# Patient Record
Sex: MALE | Race: WHITE | NOT HISPANIC OR LATINO | Employment: FULL TIME
[De-identification: names, ages, dates, MRNs, and addresses within clinical notes are randomized per-mention and may not be internally consistent; named-entity substitution may affect disease eponyms.]

---

## 2017-01-09 ENCOUNTER — RECORDS - HEALTHEAST (OUTPATIENT)
Dept: ADMINISTRATIVE | Facility: OTHER | Age: 42
End: 2017-01-09

## 2017-05-22 ENCOUNTER — RECORDS - HEALTHEAST (OUTPATIENT)
Dept: ADMINISTRATIVE | Facility: OTHER | Age: 42
End: 2017-05-22

## 2017-07-14 ENCOUNTER — RECORDS - HEALTHEAST (OUTPATIENT)
Dept: ADMINISTRATIVE | Facility: OTHER | Age: 42
End: 2017-07-14

## 2017-08-10 ENCOUNTER — RECORDS - HEALTHEAST (OUTPATIENT)
Dept: ADMINISTRATIVE | Facility: OTHER | Age: 42
End: 2017-08-10

## 2017-08-16 ENCOUNTER — RECORDS - HEALTHEAST (OUTPATIENT)
Dept: ADMINISTRATIVE | Facility: OTHER | Age: 42
End: 2017-08-16

## 2017-08-17 ENCOUNTER — RECORDS - HEALTHEAST (OUTPATIENT)
Dept: ADMINISTRATIVE | Facility: OTHER | Age: 42
End: 2017-08-17

## 2021-03-18 ENCOUNTER — HOSPITAL ENCOUNTER (EMERGENCY)
Facility: CLINIC | Age: 46
Discharge: HOME | End: 2021-03-18
Attending: NURSE PRACTITIONER | Admitting: NURSE PRACTITIONER
Payer: COMMERCIAL

## 2021-03-18 VITALS
RESPIRATION RATE: 18 BRPM | SYSTOLIC BLOOD PRESSURE: 136 MMHG | TEMPERATURE: 97.6 F | DIASTOLIC BLOOD PRESSURE: 81 MMHG | BODY MASS INDEX: 33.6 KG/M2 | OXYGEN SATURATION: 98 % | WEIGHT: 240 LBS | HEART RATE: 71 BPM | HEIGHT: 71 IN

## 2021-03-18 DIAGNOSIS — S05.00XA CORNEAL ABRASION: ICD-10-CM

## 2021-03-18 PROCEDURE — 250N000009 HC RX 250: Performed by: NURSE PRACTITIONER

## 2021-03-18 PROCEDURE — 99283 EMERGENCY DEPT VISIT LOW MDM: CPT | Performed by: NURSE PRACTITIONER

## 2021-03-18 PROCEDURE — 99284 EMERGENCY DEPT VISIT MOD MDM: CPT | Performed by: NURSE PRACTITIONER

## 2021-03-18 RX ORDER — ERYTHROMYCIN 5 MG/G
0.5 OINTMENT OPHTHALMIC 3 TIMES DAILY
Qty: 1 G | Refills: 0 | Status: ON HOLD | OUTPATIENT
Start: 2021-03-18 | End: 2023-02-28

## 2021-03-18 RX ORDER — TETRACAINE HYDROCHLORIDE 5 MG/ML
1-2 SOLUTION OPHTHALMIC ONCE
Status: COMPLETED | OUTPATIENT
Start: 2021-03-18 | End: 2021-03-18

## 2021-03-18 RX ADMIN — TETRACAINE HYDROCHLORIDE 2 DROP: 5 SOLUTION OPHTHALMIC at 16:31

## 2021-03-18 ASSESSMENT — VISUAL ACUITY
OU: 1
OS: 20/70
OD: 20/25

## 2021-03-18 ASSESSMENT — ENCOUNTER SYMPTOMS
EYE DISCHARGE: 1
EYE REDNESS: 1
PHOTOPHOBIA: 0

## 2021-03-18 ASSESSMENT — MIFFLIN-ST. JEOR: SCORE: 2005.76

## 2021-03-18 NOTE — ED PROVIDER NOTES
History     Chief Complaint   Patient presents with     Eye Pain     left eye rendess, pain and clear drainage when woke up this am     HPI  Bi Zavala is a 45 year old male who presents to the emergency department for evaluation of left eye redness. Patient reports he awoke this am and had diffuse redness to the left eye and a scratchy sensation. Clear drainage/tears throughout the day. No vision changes or eye pain. No known injury or foreign body.     Allergies:  Allergies   Allergen Reactions     Sulfa Drugs Rash       Problem List:    Patient Active Problem List    Diagnosis Date Noted     CARDIOVASCULAR SCREENING; LDL GOAL LESS THAN 160 10/31/2010     Priority: Medium     REG ENTERITIS, LG INTEST 08/07/2008     Priority: Medium     probably not inflammatory bowel disease apparently per GI - get records - currently asx, and not on asocol or prednisone.       Anemia 06/25/2007     Priority: Medium     appears to have stabilized but needs to reverse to keep above 8 - relatively asx - start nifrex   Problem list name updated by automated process. Provider to review       Hypopotassemia 06/25/2007     Priority: Medium     probably from prednisone, though had started prior w diarrhea.  stable on 40 meq KCl       Other type of migraine 10/16/2005     Priority: Medium     Diagnosis updated by automated process. Provider to review and confirm.          Past Medical History:    Past Medical History:   Diagnosis Date     Migraine NOS/not intrcbl      Pervasv dev dis-cur NEC        Past Surgical History:    No past surgical history on file.    Family History:    Family History   Problem Relation Age of Onset     Musculoskeletal Disorder Mother         MS     Hypertension Mother      Neurologic Disorder Mother         migraine     Arthritis Mother      Allergies Mother      Hypertension Father      Eye Disorder Father      Arthritis Maternal Grandmother      Osteoporosis Maternal Grandmother      Arthritis Maternal  "Grandfather      Arthritis Paternal Grandmother      Eye Disorder Paternal Grandmother      Heart Disease Paternal Grandfather      Neurologic Disorder Sister         migraines       Social History:  Marital Status:  Single [1]  Social History     Tobacco Use     Smoking status: Never Smoker   Substance Use Topics     Alcohol use: No     Drug use: No        Medications:    erythromycin (ROMYCIN) 5 MG/GM ophthalmic ointment  Ferrous Sulfate 324 (65 FE) MG TBEC  OMEPRAZOLE PO          Review of Systems   Eyes: Positive for discharge and redness. Negative for photophobia and visual disturbance.   All other systems reviewed and are negative.      Physical Exam   BP: 136/81  Pulse: 71  Temp: 97.6  F (36.4  C)  Resp: 18  Height: 180.3 cm (5' 11\")  Weight: 108.9 kg (240 lb)  SpO2: 98 %      Physical Exam  Constitutional:       General: He is not in acute distress.     Appearance: Normal appearance.   Eyes:      General: Lids are normal. Vision grossly intact. Gaze aligned appropriately.         Left eye: No foreign body.      Extraocular Movements: Extraocular movements intact.      Conjunctiva/sclera:      Left eye: Left conjunctiva is injected.      Pupils: Pupils are equal, round, and reactive to light.      Left eye: Corneal abrasion and fluorescein uptake present.        Comments: Very small piece of black matter removed from the left upper eyelid once everted. Left corneal abrasion as indicated above seen with fluorescein.    Cardiovascular:      Rate and Rhythm: Normal rate.   Pulmonary:      Effort: Pulmonary effort is normal.   Musculoskeletal: Normal range of motion.   Skin:     General: Skin is warm.      Capillary Refill: Capillary refill takes less than 2 seconds.   Neurological:      General: No focal deficit present.      Mental Status: He is alert.         ED Course        Procedures    No results found for this or any previous visit (from the past 24 hour(s)).    Medications   tetracaine (PONTOCAINE) 0.5 % " ophthalmic solution 1-2 drop (2 drops Left Eye Given 3/18/21 6639)       Assessments & Plan (with Medical Decision Making)   Bi Zavala is a 45 year old male who presents to the emergency department for evaluation of left eye redness. Patient reports he awoke this am and had diffuse redness to the left eye and a scratchy sensation. Clear drainage/tears throughout the day. Vital signs normal. Physical exam as above. Will provide erythromycin ointment. Advised to follow up with optho in 2-3 days. Return to the department with new or worsening symptoms. Patient and mother are agreeable to plan of care and comfortable with discharge. Discharged in good condition.     I have reviewed the nursing notes.    I have reviewed the findings, diagnosis, plan and need for follow up with the patient.  Discharge Medication List as of 3/18/2021  4:41 PM      START taking these medications    Details   erythromycin (ROMYCIN) 5 MG/GM ophthalmic ointment Place 0.5 inches Into the left eye 3 times dailyDisp-1 g, [id number]           Final diagnoses:   Corneal abrasion     3/18/2021   [facility] EMERGENCY DEPT     Arabella Abbott, APRN CNP  03/18/21 1593

## 2021-07-24 ENCOUNTER — HEALTH MAINTENANCE LETTER (OUTPATIENT)
Age: 46
End: 2021-07-24

## 2021-09-18 ENCOUNTER — HEALTH MAINTENANCE LETTER (OUTPATIENT)
Age: 46
End: 2021-09-18

## 2022-08-20 ENCOUNTER — HEALTH MAINTENANCE LETTER (OUTPATIENT)
Age: 47
End: 2022-08-20

## 2022-11-19 ENCOUNTER — HEALTH MAINTENANCE LETTER (OUTPATIENT)
Age: 47
End: 2022-11-19

## 2023-02-28 ENCOUNTER — ANESTHESIA EVENT (OUTPATIENT)
Dept: SURGERY | Facility: HOSPITAL | Age: 48
End: 2023-02-28
Payer: COMMERCIAL

## 2023-02-28 ENCOUNTER — TRANSFERRED RECORDS (OUTPATIENT)
Dept: HEALTH INFORMATION MANAGEMENT | Facility: CLINIC | Age: 48
End: 2023-02-28
Payer: COMMERCIAL

## 2023-02-28 ENCOUNTER — ANESTHESIA (OUTPATIENT)
Dept: SURGERY | Facility: HOSPITAL | Age: 48
End: 2023-02-28
Payer: COMMERCIAL

## 2023-02-28 ENCOUNTER — HOSPITAL ENCOUNTER (OUTPATIENT)
Facility: HOSPITAL | Age: 48
Discharge: HOME | End: 2023-02-28
Attending: COLON & RECTAL SURGERY | Admitting: COLON & RECTAL SURGERY
Payer: COMMERCIAL

## 2023-02-28 VITALS
HEART RATE: 67 BPM | SYSTOLIC BLOOD PRESSURE: 112 MMHG | DIASTOLIC BLOOD PRESSURE: 72 MMHG | WEIGHT: 250.8 LBS | OXYGEN SATURATION: 97 % | RESPIRATION RATE: 9 BRPM | BODY MASS INDEX: 34.98 KG/M2 | TEMPERATURE: 98.6 F

## 2023-02-28 DIAGNOSIS — K61.0 PERIANAL ABSCESS: Primary | ICD-10-CM

## 2023-02-28 DIAGNOSIS — K60.30 ANAL FISTULA: Primary | ICD-10-CM

## 2023-02-28 PROCEDURE — 250N000009 HC RX 250: Performed by: ANESTHESIOLOGY

## 2023-02-28 PROCEDURE — 258N000003 HC RX IP 258 OP 636: Performed by: NURSE ANESTHETIST, CERTIFIED REGISTERED

## 2023-02-28 PROCEDURE — 258N000003 HC RX IP 258 OP 636: Performed by: ANESTHESIOLOGY

## 2023-02-28 PROCEDURE — 370N000017 HC ANESTHESIA TECHNICAL FEE, PER MIN: Performed by: COLON & RECTAL SURGERY

## 2023-02-28 PROCEDURE — 250N000011 HC RX IP 250 OP 636: Performed by: COLON & RECTAL SURGERY

## 2023-02-28 PROCEDURE — 250N000011 HC RX IP 250 OP 636: Performed by: ANESTHESIOLOGY

## 2023-02-28 PROCEDURE — 250N000025 HC SEVOFLURANE, PER MIN: Performed by: COLON & RECTAL SURGERY

## 2023-02-28 PROCEDURE — 999N000141 HC STATISTIC PRE-PROCEDURE NURSING ASSESSMENT: Performed by: COLON & RECTAL SURGERY

## 2023-02-28 PROCEDURE — 250N000011 HC RX IP 250 OP 636: Performed by: NURSE ANESTHETIST, CERTIFIED REGISTERED

## 2023-02-28 PROCEDURE — 250N000009 HC RX 250: Performed by: NURSE ANESTHETIST, CERTIFIED REGISTERED

## 2023-02-28 PROCEDURE — 272N000001 HC OR GENERAL SUPPLY STERILE: Performed by: COLON & RECTAL SURGERY

## 2023-02-28 PROCEDURE — 710N000009 HC RECOVERY PHASE 1, LEVEL 1, PER MIN: Performed by: COLON & RECTAL SURGERY

## 2023-02-28 PROCEDURE — 360N000075 HC SURGERY LEVEL 2, PER MIN: Performed by: COLON & RECTAL SURGERY

## 2023-02-28 PROCEDURE — 250N000009 HC RX 250: Performed by: COLON & RECTAL SURGERY

## 2023-02-28 PROCEDURE — 710N000012 HC RECOVERY PHASE 2, PER MINUTE: Performed by: COLON & RECTAL SURGERY

## 2023-02-28 RX ORDER — PROPOFOL 10 MG/ML
INJECTION, EMULSION INTRAVENOUS PRN
Status: DISCONTINUED | OUTPATIENT
Start: 2023-02-28 | End: 2023-02-28

## 2023-02-28 RX ORDER — KETAMINE HYDROCHLORIDE 10 MG/ML
INJECTION INTRAMUSCULAR; INTRAVENOUS PRN
Status: DISCONTINUED | OUTPATIENT
Start: 2023-02-28 | End: 2023-02-28

## 2023-02-28 RX ORDER — BUPIVACAINE HYDROCHLORIDE 2.5 MG/ML
INJECTION, SOLUTION INFILTRATION; PERINEURAL PRN
Status: DISCONTINUED | OUTPATIENT
Start: 2023-02-28 | End: 2023-02-28 | Stop reason: HOSPADM

## 2023-02-28 RX ORDER — GLYCOPYRROLATE 0.2 MG/ML
INJECTION, SOLUTION INTRAMUSCULAR; INTRAVENOUS PRN
Status: DISCONTINUED | OUTPATIENT
Start: 2023-02-28 | End: 2023-02-28

## 2023-02-28 RX ORDER — FENTANYL CITRATE 50 UG/ML
25 INJECTION, SOLUTION INTRAMUSCULAR; INTRAVENOUS EVERY 5 MIN PRN
Status: CANCELLED | OUTPATIENT
Start: 2023-02-28

## 2023-02-28 RX ORDER — PROPRANOLOL HYDROCHLORIDE 120 MG/1
160 CAPSULE, EXTENDED RELEASE ORAL DAILY
COMMUNITY
Start: 2022-04-06

## 2023-02-28 RX ORDER — ONDANSETRON 2 MG/ML
INJECTION INTRAMUSCULAR; INTRAVENOUS PRN
Status: DISCONTINUED | OUTPATIENT
Start: 2023-02-28 | End: 2023-02-28

## 2023-02-28 RX ORDER — SODIUM CHLORIDE, SODIUM LACTATE, POTASSIUM CHLORIDE, CALCIUM CHLORIDE 600; 310; 30; 20 MG/100ML; MG/100ML; MG/100ML; MG/100ML
INJECTION, SOLUTION INTRAVENOUS CONTINUOUS PRN
Status: DISCONTINUED | OUTPATIENT
Start: 2023-02-28 | End: 2023-02-28

## 2023-02-28 RX ORDER — ACETAMINOPHEN 325 MG/1
650 TABLET ORAL
Status: CANCELLED | OUTPATIENT
Start: 2023-02-28

## 2023-02-28 RX ORDER — ACETAMINOPHEN 325 MG/1
650 TABLET ORAL EVERY 4 HOURS PRN
Qty: 100 TABLET | Refills: 0 | COMMUNITY
Start: 2023-02-28 | End: 2023-03-14

## 2023-02-28 RX ORDER — ONDANSETRON 4 MG/1
4 TABLET, ORALLY DISINTEGRATING ORAL
Status: CANCELLED | OUTPATIENT
Start: 2023-02-28

## 2023-02-28 RX ORDER — DEXAMETHASONE SODIUM PHOSPHATE 10 MG/ML
INJECTION, SOLUTION INTRAMUSCULAR; INTRAVENOUS PRN
Status: DISCONTINUED | OUTPATIENT
Start: 2023-02-28 | End: 2023-02-28

## 2023-02-28 RX ORDER — LIDOCAINE 40 MG/G
CREAM TOPICAL
Status: CANCELLED | OUTPATIENT
Start: 2023-02-28

## 2023-02-28 RX ORDER — ONDANSETRON 2 MG/ML
4 INJECTION INTRAMUSCULAR; INTRAVENOUS EVERY 30 MIN PRN
Status: CANCELLED | OUTPATIENT
Start: 2023-02-28

## 2023-02-28 RX ORDER — CIPROFLOXACIN 500 MG/1
500 TABLET, FILM COATED ORAL 2 TIMES DAILY
Qty: 14 TABLET | Refills: 0 | Status: SHIPPED | OUTPATIENT
Start: 2023-02-28 | End: 2023-03-10

## 2023-02-28 RX ORDER — OXYCODONE HYDROCHLORIDE 5 MG/1
5 TABLET ORAL
Status: CANCELLED | OUTPATIENT
Start: 2023-02-28

## 2023-02-28 RX ORDER — SODIUM CHLORIDE, SODIUM LACTATE, POTASSIUM CHLORIDE, CALCIUM CHLORIDE 600; 310; 30; 20 MG/100ML; MG/100ML; MG/100ML; MG/100ML
INJECTION, SOLUTION INTRAVENOUS CONTINUOUS
Status: CANCELLED | OUTPATIENT
Start: 2023-02-28

## 2023-02-28 RX ORDER — UPADACITINIB 15 MG/1
1 TABLET, EXTENDED RELEASE ORAL DAILY
COMMUNITY
End: 2023-10-25

## 2023-02-28 RX ORDER — SUMATRIPTAN 50 MG/1
50 TABLET, FILM COATED ORAL
COMMUNITY

## 2023-02-28 RX ORDER — HYDROMORPHONE HYDROCHLORIDE 1 MG/ML
0.2 INJECTION, SOLUTION INTRAMUSCULAR; INTRAVENOUS; SUBCUTANEOUS EVERY 5 MIN PRN
Status: CANCELLED | OUTPATIENT
Start: 2023-02-28

## 2023-02-28 RX ORDER — HYDROMORPHONE HYDROCHLORIDE 1 MG/ML
0.4 INJECTION, SOLUTION INTRAMUSCULAR; INTRAVENOUS; SUBCUTANEOUS EVERY 5 MIN PRN
Status: CANCELLED | OUTPATIENT
Start: 2023-02-28

## 2023-02-28 RX ORDER — FENTANYL CITRATE 50 UG/ML
50 INJECTION, SOLUTION INTRAMUSCULAR; INTRAVENOUS EVERY 5 MIN PRN
Status: CANCELLED | OUTPATIENT
Start: 2023-02-28

## 2023-02-28 RX ORDER — OXYCODONE HYDROCHLORIDE 5 MG/1
10 TABLET ORAL
Status: CANCELLED | OUTPATIENT
Start: 2023-02-28

## 2023-02-28 RX ORDER — MAGNESIUM SULFATE 4 G/50ML
4 INJECTION INTRAVENOUS ONCE
Status: CANCELLED | OUTPATIENT
Start: 2023-02-28 | End: 2023-02-28

## 2023-02-28 RX ORDER — LIDOCAINE HYDROCHLORIDE 10 MG/ML
INJECTION, SOLUTION INFILTRATION; PERINEURAL PRN
Status: DISCONTINUED | OUTPATIENT
Start: 2023-02-28 | End: 2023-02-28

## 2023-02-28 RX ORDER — FENTANYL CITRATE 50 UG/ML
INJECTION, SOLUTION INTRAMUSCULAR; INTRAVENOUS PRN
Status: DISCONTINUED | OUTPATIENT
Start: 2023-02-28 | End: 2023-02-28

## 2023-02-28 RX ORDER — METRONIDAZOLE 250 MG/1
500 TABLET ORAL 3 TIMES DAILY
Qty: 15 TABLET | Refills: 0 | Status: SHIPPED | OUTPATIENT
Start: 2023-02-28 | End: 2023-03-10

## 2023-02-28 RX ORDER — ONDANSETRON 4 MG/1
4 TABLET, ORALLY DISINTEGRATING ORAL EVERY 30 MIN PRN
Status: CANCELLED | OUTPATIENT
Start: 2023-02-28

## 2023-02-28 RX ADMIN — PROPOFOL 180 MG: 10 INJECTION, EMULSION INTRAVENOUS at 16:47

## 2023-02-28 RX ADMIN — LIDOCAINE HYDROCHLORIDE 4 ML: 10 INJECTION, SOLUTION INFILTRATION; PERINEURAL at 16:47

## 2023-02-28 RX ADMIN — ONDANSETRON 4 MG: 2 INJECTION INTRAMUSCULAR; INTRAVENOUS at 17:19

## 2023-02-28 RX ADMIN — GLYCOPYRROLATE 0.2 MG: 0.2 INJECTION, SOLUTION INTRAMUSCULAR; INTRAVENOUS at 16:47

## 2023-02-28 RX ADMIN — PHENYLEPHRINE HYDROCHLORIDE 100 MCG: 10 INJECTION INTRAVENOUS at 17:09

## 2023-02-28 RX ADMIN — DEXAMETHASONE SODIUM PHOSPHATE 10 MG: 10 INJECTION, SOLUTION INTRAMUSCULAR; INTRAVENOUS at 16:47

## 2023-02-28 RX ADMIN — KETAMINE HYDROCHLORIDE 50 MG: 10 INJECTION, SOLUTION INTRAMUSCULAR; INTRAVENOUS at 16:47

## 2023-02-28 RX ADMIN — PHENYLEPHRINE HYDROCHLORIDE 100 MCG: 10 INJECTION INTRAVENOUS at 17:01

## 2023-02-28 RX ADMIN — MIDAZOLAM 1 MG: 1 INJECTION INTRAMUSCULAR; INTRAVENOUS at 16:42

## 2023-02-28 RX ADMIN — PHENYLEPHRINE HYDROCHLORIDE 100 MCG: 10 INJECTION INTRAVENOUS at 16:47

## 2023-02-28 RX ADMIN — SUGAMMADEX 240 MG: 100 INJECTION, SOLUTION INTRAVENOUS at 17:20

## 2023-02-28 RX ADMIN — MIDAZOLAM 1 MG: 1 INJECTION INTRAMUSCULAR; INTRAVENOUS at 16:47

## 2023-02-28 RX ADMIN — ROCURONIUM BROMIDE 50 MG: 50 INJECTION, SOLUTION INTRAVENOUS at 16:47

## 2023-02-28 RX ADMIN — SODIUM CHLORIDE, POTASSIUM CHLORIDE, SODIUM LACTATE AND CALCIUM CHLORIDE: 600; 310; 30; 20 INJECTION, SOLUTION INTRAVENOUS at 16:36

## 2023-02-28 RX ADMIN — FENTANYL CITRATE 100 MCG: 50 INJECTION, SOLUTION INTRAMUSCULAR; INTRAVENOUS at 16:47

## 2023-02-28 ASSESSMENT — ACTIVITIES OF DAILY LIVING (ADL)
ADLS_ACUITY_SCORE: 18
ADLS_ACUITY_SCORE: 35
ADLS_ACUITY_SCORE: 18

## 2023-02-28 NOTE — ANESTHESIA PROCEDURE NOTES
Airway       Patient location during procedure: OR       Procedure Start/Stop Times: 2/28/2023 4:50 PM  Staff -        CRNA: Bernadette Connell APRN CRNA       Performed By: CRNA  Consent for Airway        Urgency: elective  Indications and Patient Condition       Indications for airway management: lui-procedural       Induction type:intravenous       Mask difficulty assessment: 2 - vent by mask + OA or adjuvant +/- NMBA    Final Airway Details       Final airway type: endotracheal airway       Successful airway: ETT - single  Endotracheal Airway Details        ETT size (mm): 8.0       Cuffed: yes       Successful intubation technique: direct laryngoscopy       DL Blade Type: MAC 4       Adjucts: stylet       Position: Right       Measured from: lips       Secured at (cm): 24       Bite block used: Soft    Post intubation assessment        Placement verified by: capnometry, equal breath sounds and chest rise        Number of attempts at approach: 1       Number of other approaches attempted: 0       Secured with: silk tape       Ease of procedure: easy       Dentition: Intact and Unchanged       Dental guard used and removed. Dental Guard Type: Proguard Red.    Medication(s) Administered   Medication Administration Time: 2/28/2023 4:50 PM

## 2023-02-28 NOTE — ANESTHESIA CARE TRANSFER NOTE
Patient: Bi Zavala    Procedure: Procedure(s):  EXAM UNDER ANESTHESIA, SETON PLACEMENT       Diagnosis: Perianal abscess [K61.0]  Diagnosis Additional Information: No value filed.    Anesthesia Type:   General     Note:    Oropharynx: oropharynx clear of all foreign objects  Level of Consciousness: awake  Oxygen Supplementation: face mask  Level of Supplemental Oxygen (L/min / FiO2): 6  Independent Airway: airway patency satisfactory and stable  Dentition: dentition unchanged  Vital Signs Stable: post-procedure vital signs reviewed and stable  Report to RN Given: handoff report given  Patient transferred to: PACU    Handoff Report: Identifed the Patient, Identified the Reponsible Provider, Reviewed the pertinent medical history, Discussed the surgical course, Reviewed Intra-OP anesthesia mangement and issues during anesthesia, Set expectations for post-procedure period and Allowed opportunity for questions and acknowledgement of understanding      Vitals:  Vitals Value Taken Time   /84 02/28/23 1732   Temp     Pulse 80 02/28/23 1733   Resp 18 02/28/23 1733   SpO2 98 % 02/28/23 1733   Vitals shown include unvalidated device data.    Electronically Signed By: ANGEL Delacruz CRNA  February 28, 2023  5:34 PM

## 2023-02-28 NOTE — ANESTHESIA PREPROCEDURE EVALUATION
Anesthesia Pre-Procedure Evaluation    Patient: Bi Zavala   MRN: [id number] : 1975        Procedure : Procedure(s):  EXAM UNDER ANESTHESIA, POSSIBLE INCISION AND DRAINAGE OF ABSCESS, POSSIBLE SETON PLACEMENT          Past Medical History:   Diagnosis Date     Migraine, unspecified, without mention of intractable migraine without mention of status migrainosus     infrequent, good with maxalt 10/05     Other specified pervasive developmental disorders, current or active state     aspergers syndrome      Past Surgical History:   Procedure Laterality Date     ABDOMEN SURGERY  at age 7    for hematoma     COLECTOMY  Aug 6,2013     HERNIA REPAIR Bilateral at age 4    inguinal and undescended testicle surgery     LASIK  2010     OTHER SURGICAL HISTORY Left at age 6    fractured elbow     PILONIDAL CYST / SINUS EXCISION       MO LAP, SURG CLOSE ENTEROSTOMY RESECT ANAST N/A 2014    Procedure: COLOSTOMY TAKEDOWN;  Surgeon: Lashae Morales MD;  Location: [facility] [place];  Service: General     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     No history of anesthetic complications       ROS/MED HX  ENT/Pulmonary:  - neg pulmonary ROS     Neurologic:     (+) migraines, Developmental delay, level of function: Mild cognitive impairement, Asperger's syndrome,     Cardiovascular:  - neg cardiovascular ROS     METS/Exercise Tolerance: >4 METS    Hematologic:  - neg hematologic  ROS     Musculoskeletal:  - neg musculoskeletal ROS     GI/Hepatic:  - neg GI/hepatic ROS     Renal/Genitourinary:  - neg Renal ROS     Endo:     (+) Obesity,     Psychiatric/Substance Use:  - neg  psychiatric ROS     Infectious Disease:  - neg infectious disease ROS     Malignancy:  - neg malignancy ROS     Other:  - neg other ROS          Physical Exam    Airway  airway exam normal      Mallampati: II   TM distance: > 3 FB   Neck ROM: full   Mouth opening: > 3 cm    Respiratory Devices and Support         Dental           Cardiovascular   cardiovascular exam normal          Pulmonary   pulmonary exam normal                OUTSIDE LABS:  CBC:   Lab Results   Component Value Date    WBC 6.4 11/19/2019    WBC 10.8 01/27/2018    HGB 14.7 11/19/2019    HGB 14.6 01/27/2018    HCT 41.8 11/19/2019    HCT 42.5 01/27/2018     11/19/2019     01/27/2018     BMP:   Lab Results   Component Value Date     11/19/2019     01/27/2018    POTASSIUM 4.1 11/19/2019    POTASSIUM 3.8 01/27/2018    CHLORIDE 108 (H) 11/19/2019    CHLORIDE 109 (H) 01/27/2018    CO2 24 11/19/2019    CO2 21 (L) 01/27/2018    BUN 15 11/19/2019    BUN 14 01/27/2018    CR 0.99 11/19/2019    CR 0.82 01/27/2018     11/19/2019     01/27/2018     COAGS:   Lab Results   Component Value Date    INR 1.14 08/04/2013     POC: No results found for: BGM, HCG, HCGS  HEPATIC:   Lab Results   Component Value Date    ALBUMIN 4.4 11/19/2019    PROTTOTAL 7.6 11/19/2019    ALT 19 11/19/2019    AST 31 11/19/2019    ALKPHOS 55 11/19/2019    BILITOTAL 0.3 11/19/2019     OTHER:   Lab Results   Component Value Date    RADHA 10.1 11/19/2019    MAG 2.1 06/14/2007    LIPASE 58 (H) 11/19/2019    CRP 0.1 11/19/2019    SED 7 11/19/2019       Anesthesia Plan    ASA Status:  2   NPO Status:  NPO Appropriate    Anesthesia Type: General.     - Airway: ETT   Induction: Intravenous, Propofol.   Maintenance: Balanced.        Consents    Anesthesia Plan(s) and associated risks, benefits, and realistic alternatives discussed. Questions answered and patient/representative(s) expressed understanding.    - Discussed:     - Discussed with:  Patient      -  Extended Intubation/Ventilatory Support Discussed: No.      - Patient is DNR/DNI Status: No    Use of blood products discussed: No .     Postoperative Care    Pain management: IV analgesics, Multi-modal analgesia.   PONV prophylaxis: Ondansetron (or other 5HT-3), Dexamethasone or Solumedrol, Droperidol or Haldol     Comments:    Other Comments: 50 mg ketamine IV on induction.            Dandy Willett MD

## 2023-02-28 NOTE — H&P
Colorectal Surgery Staff:  I have seen and examined the patient. I agree with the above documentation and plan of the fellow/PA above with the following additions/chages:    47M w/ IBD, presumed UC, but now intersphincteric abscess and possible fistula.    Plan on rectal exam under anesthesia, possible fistulotomy, possible seton.     MD MARJORIE HendricksonA  [facility]  Office: [phone]  2/28/2023 4:30 PM

## 2023-02-28 NOTE — OP NOTE
COLON AND RECTAL SURGERY OPERATIVE NOTE    DATE OF SERVICE: 2/28/2023     PROCEDURE NAME:   1. Rectal exam under anesthesia  2. Seton placement     PRE-PROCEDURE DIAGNOSIS:   1. Ulcerative colitis  2. Proctocolitis  3. Anorectal abscess/fistula     POST-PROCEDURE DIAGNOSIS:   1. Inflammatory bowel disease (Crohn's Disease suspected)  2. Proctocolitis  3. Anorectal intersphincteric abscess/intersphincteric fistula     SURGEON: Fabiola Kirkland MD     ASSISTANT: None     FINDINGS: There was an external fistula opening in the posterior midline.  There was significant proctitis with purulence within the anal canal and distal rectum.  Due to the suspicion of Crohn's disease, a seton was placed through this intersphincteric fistula although in the absence of Crohn's disease, this could have easily been treated with a fistulotomy.     ESTIMATED BLOOD LOSS: 1 mL     ANESTHESIA: General     SPECIMEN: None.     INDICATIONS FOR PROCEDURE:  Bi Zavala is a 47 year old male with a history of autism and inflammatory bowel disease that was previously diagnosed as ulcerative colitis who underwent a prior subtotal colectomy and ileostomy and subsequent ileostomy reversal by a prior surgeon.  He presented to me due to concerns of his gastroenterologist of severe anorectal pain. A CT scan was consistent with proctocolitis and an intersphincteric abscess and fistula. The risks and benefits of surgery were discussed with the patient including infection, abscess recurrence, need for further operative interventions, possible damage to the sphincter and incontinence (or changes in continence) and increased pain and bleeding were discussed with the patient. Alternatives were also discussed. The patient agreed to proceed with surgery and informed consent was obtained.     DESCRIPTION OF PROCEDURE:  The patient was taken to the operating room and placed under general anesthesia. They were then placed in the prone lorrie knife position on the  operating room table. The buttocks were taped apart. The surgical area was then prepped and draped in the usual sterile fashion. A timeout was performed per protocol.  Examination of the perianal skin was significant for a well healed pilonidal wound closure.  There was also a posterior midline external fistula opening.  Next, a terminal pudendal nerve block was performed with 40mL of a 50/50 mix of 1% lidocaine with epinephrine and 0.25% bupivicaine. A digital rectal exam was performed which revealed inflamed tissue within the anal canal.  Next a lubricated Castro bivalve anoscope was placed into the anal canal.  Examination of the anal canal was significant for significant proctitis with purulence within the anal canal.    Based on these findings I proceeded to evaluate the fistula.  A fistula probe was placed in the external opening and easily fell into the internal opening in the posterior midline.  The abscess had already spontaneously drained out of this opening.  This was clearly only intersphincteric in nature.  Given the history of inflammatory bowel disease, the presence of a fistula, and the extent of proctocolitis, I had significant concerns for Crohn's disease and therefore placed a seton rather than performing a fistulotomy.    0 Prolene was placed down the shaft of the fistula probe and then tied to a Silastic vessel loop which was secured to itself using four 3-0 silk ties.  This completed the planned procedure.    Hemostasis was confirmed. All sponge, needle and instrument counts were correct at the end of the procedure. The patient tolerated the procedure well and was awakened from anesthesia without difficulty, and transferred to the recovery room in stable condition.    COMPLICATIONS: None apparent    DISPOSITION: Stable to PACU    Fabiola Kirkland MD, ANDREIA  [facility]  Office: [phone]  2/28/2023 5:37 PM

## 2023-03-01 NOTE — ANESTHESIA POSTPROCEDURE EVALUATION
Patient: Bi Zavala    Procedure: Procedure(s):  EXAM UNDER ANESTHESIA, SETON PLACEMENT       Anesthesia Type:  General    Note:  Disposition: Outpatient   Postop Pain Control: Uneventful            Sign Out: Well controlled pain   PONV: No   Neuro/Psych: Uneventful            Sign Out: Acceptable/Baseline neuro status   Airway/Respiratory: Uneventful            Sign Out: Acceptable/Baseline resp. status   CV/Hemodynamics: Uneventful            Sign Out: Acceptable CV status   Other NRE:    DID A NON-ROUTINE EVENT OCCUR?            Last vitals:  Vitals Value Taken Time   /73 02/28/23 1830   Temp 37  C (98.6  F) 02/28/23 1731   Pulse 64 02/28/23 1842   Resp 8 02/28/23 1842   SpO2 97 % 02/28/23 1842   Vitals shown include unvalidated device data.    Electronically Signed By: Lenard Oconnell MD  February 28, 2023  7:09 PM

## 2023-03-01 NOTE — DISCHARGE INSTRUCTIONS
Patient Discharge Instructions:    You have just undergone anorectal surgery.  Here are a few things to expect after your surgery:    1) It is common to have pain after surgery.  We try to focus on non-narcotic medications when possible, although sometimes we do give a narcotic prescription for pain that cannot be controlled with Tylenol and Ibuprofen. Take the medications as follows:  Tylenol: 650mg every 4 hours for 48 hours. Then every 4 hour as needed for pain. Do not take the Tylenol if you have been otherwise directed by a doctor not to take it (for liver problems, etc).  Ibuprofen: 800mg every 8 hours for 48 hours WITH FOOD. Then every 8 hours as needed for pain. Don't take NSAIDs if you have Crohn's, Ulcerative Colitis, kidney problems, or have been otherwise instructed not to take them.   You should offset these medications (e.g. Tylenol at 12:00PM, Ibuprofen at 2pm, Tylenol at 4:00PM, Tylenol at 8:00PM, Ibuprofen at 10:00PM).  If you have Oxycodone, you can take it at any time as long as you space it out every 4 hours.     2) You may have some spotting or mild amounts of bleeding/bloody drainage.  If you see more significant bleeding, try holding some pressure over the wound for 15-20 minutes.   If you pass more than a cup full of blood or you cannot get the bleeding to stop by holding pressure, call the office.    3) Infections in this area are rare, but can be serious.  If you see small amounts of yellowish/pus like drainage in the days following surgery, this is expected. However, if you have increasing pain, increasing drainage, fevers, or an inability to urinate (can't pee), call the office or go the ER.     4) You will feel numb in the anorectal area for 4-6 hours after surgery due to the numbing medication used in the operating room.  It is possible you may experience some fecal incontinence (accidental passage of gas/stool) during this time.  The numbness will resolve on its own.  Once you feel  sensation returning, you should consider taking something for pain as you can expect oral medications to take 30-60 minutes before you start feeling their effects.    5) There is generally no specific wound care necessary immediately after surgery (unless otherwise instructed by your surgeon).  Simply showering/bathing 1-2 times a day and after bowel movements is sufficient to keep the wounds clean.  You may want to keep a dry gauze/pad over the wound site as it is normal to have some amount of drainage, and this drainage can be irritating to the skin.  If you have a packing in the wound, your surgeon will give you more specific instructions on how to manage this.    6) For some operations you may have stitches in your wound.  Those stitches almost always break within a few days of surgery.  If you feel a pop or the wound opens - this is OK.  The wound will continue to fill in on its own.  It is still ok to shower/bathe as normal.  Expect some amount of drainage if the wound is open.    7) Avoiding constipation after surgery is very important. Start by taking Psyllium Husk (e.g. Metamucil or Konsyl) 1-2 tablespoons in a large glass of water daily. In addition to this, it is very important to drink at least 64 ounces of water daily. If you get constipated, it will be much more uncomfortable when you do have a bowel movement. If you do not have a BM in 2 days, try one of the following medications (over the counter) listed below:   Milk of Magnesia 30 mL every 8 hours until BM  Miralax 1-2 capfuls daily until BM (this may take 1-2 days)  Senna 1-2 tabs twice a day as necessary    8) Your only activity restrictions are no driving while you are taking narcotics.  You may want to avoid heavy lifting/strenuous exercise for the first 1-2 days after surgery, but if you feel up to resuming normal activities/exercise, this is ok.    9) Lastly, if you have any questions or concerns - PLEASE CALL ([phone])!  Our office has  someone on call 24 hours a day.  If your question is one that can wait until normal business hours (Mon-Fri 8:30AM-5PM), it is better to wait until the daytime as someone more familiar with your care will be able to help you.  However, if it is an emergency, the on-call surgeon will be able to give you good advice.    Fabiola Kirkland MD, ANDREIA  [facility]  Office: [phone]  2/28/2023 6:25 PM

## 2023-09-10 ENCOUNTER — HEALTH MAINTENANCE LETTER (OUTPATIENT)
Age: 48
End: 2023-09-10

## 2023-10-23 ENCOUNTER — ANESTHESIA EVENT (OUTPATIENT)
Dept: SURGERY | Facility: AMBULATORY SURGERY CENTER | Age: 48
End: 2023-10-23
Payer: COMMERCIAL

## 2023-10-24 RX ORDER — ASCORBIC ACID 500 MG
500 TABLET ORAL DAILY
COMMUNITY

## 2023-10-24 RX ORDER — AZATHIOPRINE 50 MG/1
50 TABLET ORAL
COMMUNITY

## 2023-10-24 RX ORDER — ZINC GLUCONATE 50 MG
50 TABLET ORAL DAILY
COMMUNITY

## 2023-10-25 ENCOUNTER — HOSPITAL ENCOUNTER (OUTPATIENT)
Facility: AMBULATORY SURGERY CENTER | Age: 48
Discharge: HOME | End: 2023-10-25
Attending: COLON & RECTAL SURGERY
Payer: COMMERCIAL

## 2023-10-25 ENCOUNTER — ANESTHESIA (OUTPATIENT)
Dept: SURGERY | Facility: AMBULATORY SURGERY CENTER | Age: 48
End: 2023-10-25
Payer: COMMERCIAL

## 2023-10-25 VITALS
SYSTOLIC BLOOD PRESSURE: 114 MMHG | DIASTOLIC BLOOD PRESSURE: 67 MMHG | RESPIRATION RATE: 16 BRPM | HEIGHT: 71 IN | OXYGEN SATURATION: 94 % | HEART RATE: 71 BPM | BODY MASS INDEX: 34.3 KG/M2 | WEIGHT: 245 LBS | TEMPERATURE: 96.9 F

## 2023-10-25 DIAGNOSIS — K50.113 CROHN'S DISEASE OF LARGE INTESTINE WITH FISTULA (H): Primary | ICD-10-CM

## 2023-10-25 RX ORDER — FENTANYL CITRATE 0.05 MG/ML
25 INJECTION, SOLUTION INTRAMUSCULAR; INTRAVENOUS
Status: DISCONTINUED | OUTPATIENT
Start: 2023-10-25 | End: 2023-10-26 | Stop reason: HOSPADM

## 2023-10-25 RX ORDER — ACETAMINOPHEN 325 MG/1
650 TABLET ORAL EVERY 4 HOURS PRN
COMMUNITY
Start: 2023-10-25 | End: 2023-11-08

## 2023-10-25 RX ORDER — FENTANYL CITRATE 50 UG/ML
INJECTION, SOLUTION INTRAMUSCULAR; INTRAVENOUS PRN
Status: DISCONTINUED | OUTPATIENT
Start: 2023-10-25 | End: 2023-10-25

## 2023-10-25 RX ORDER — SODIUM CHLORIDE, SODIUM LACTATE, POTASSIUM CHLORIDE, CALCIUM CHLORIDE 600; 310; 30; 20 MG/100ML; MG/100ML; MG/100ML; MG/100ML
INJECTION, SOLUTION INTRAVENOUS CONTINUOUS
Status: DISCONTINUED | OUTPATIENT
Start: 2023-10-25 | End: 2023-10-26 | Stop reason: HOSPADM

## 2023-10-25 RX ORDER — FENTANYL CITRATE 0.05 MG/ML
25 INJECTION, SOLUTION INTRAMUSCULAR; INTRAVENOUS EVERY 5 MIN PRN
Status: DISCONTINUED | OUTPATIENT
Start: 2023-10-25 | End: 2023-10-26 | Stop reason: HOSPADM

## 2023-10-25 RX ORDER — ONDANSETRON 2 MG/ML
4 INJECTION INTRAMUSCULAR; INTRAVENOUS EVERY 30 MIN PRN
Status: DISCONTINUED | OUTPATIENT
Start: 2023-10-25 | End: 2023-10-26 | Stop reason: HOSPADM

## 2023-10-25 RX ORDER — FENTANYL CITRATE 0.05 MG/ML
50 INJECTION, SOLUTION INTRAMUSCULAR; INTRAVENOUS EVERY 5 MIN PRN
Status: DISCONTINUED | OUTPATIENT
Start: 2023-10-25 | End: 2023-10-26 | Stop reason: HOSPADM

## 2023-10-25 RX ORDER — OXYCODONE HYDROCHLORIDE 5 MG/1
5 TABLET ORAL
Status: DISCONTINUED | OUTPATIENT
Start: 2023-10-25 | End: 2023-10-26 | Stop reason: HOSPADM

## 2023-10-25 RX ORDER — LIDOCAINE HYDROCHLORIDE 20 MG/ML
INJECTION, SOLUTION INFILTRATION; PERINEURAL PRN
Status: DISCONTINUED | OUTPATIENT
Start: 2023-10-25 | End: 2023-10-25

## 2023-10-25 RX ORDER — LIDOCAINE 40 MG/G
CREAM TOPICAL
Status: DISCONTINUED | OUTPATIENT
Start: 2023-10-25 | End: 2023-10-26 | Stop reason: HOSPADM

## 2023-10-25 RX ORDER — ONDANSETRON 4 MG/1
4 TABLET, ORALLY DISINTEGRATING ORAL
Status: DISCONTINUED | OUTPATIENT
Start: 2023-10-25 | End: 2023-10-26 | Stop reason: HOSPADM

## 2023-10-25 RX ORDER — ACETAMINOPHEN 325 MG/1
650 TABLET ORAL
Status: DISCONTINUED | OUTPATIENT
Start: 2023-10-25 | End: 2023-10-26 | Stop reason: HOSPADM

## 2023-10-25 RX ORDER — PROPOFOL 10 MG/ML
INJECTION, EMULSION INTRAVENOUS CONTINUOUS PRN
Status: DISCONTINUED | OUTPATIENT
Start: 2023-10-25 | End: 2023-10-25

## 2023-10-25 RX ORDER — ONDANSETRON 2 MG/ML
INJECTION INTRAMUSCULAR; INTRAVENOUS PRN
Status: DISCONTINUED | OUTPATIENT
Start: 2023-10-25 | End: 2023-10-25

## 2023-10-25 RX ORDER — ONDANSETRON 4 MG/1
4 TABLET, ORALLY DISINTEGRATING ORAL EVERY 30 MIN PRN
Status: DISCONTINUED | OUTPATIENT
Start: 2023-10-25 | End: 2023-10-26 | Stop reason: HOSPADM

## 2023-10-25 RX ORDER — HYDROMORPHONE HCL IN WATER/PF 6 MG/30 ML
0.2 PATIENT CONTROLLED ANALGESIA SYRINGE INTRAVENOUS EVERY 5 MIN PRN
Status: DISCONTINUED | OUTPATIENT
Start: 2023-10-25 | End: 2023-10-26 | Stop reason: HOSPADM

## 2023-10-25 RX ORDER — OXYCODONE HYDROCHLORIDE 10 MG/1
10 TABLET ORAL
Status: DISCONTINUED | OUTPATIENT
Start: 2023-10-25 | End: 2023-10-26 | Stop reason: HOSPADM

## 2023-10-25 RX ORDER — DEXAMETHASONE SODIUM PHOSPHATE 4 MG/ML
INJECTION, SOLUTION INTRA-ARTICULAR; INTRALESIONAL; INTRAMUSCULAR; INTRAVENOUS; SOFT TISSUE PRN
Status: DISCONTINUED | OUTPATIENT
Start: 2023-10-25 | End: 2023-10-25

## 2023-10-25 RX ORDER — GLYCOPYRROLATE 0.2 MG/ML
INJECTION, SOLUTION INTRAMUSCULAR; INTRAVENOUS PRN
Status: DISCONTINUED | OUTPATIENT
Start: 2023-10-25 | End: 2023-10-25

## 2023-10-25 RX ORDER — HYDROMORPHONE HCL IN WATER/PF 6 MG/30 ML
0.4 PATIENT CONTROLLED ANALGESIA SYRINGE INTRAVENOUS EVERY 5 MIN PRN
Status: DISCONTINUED | OUTPATIENT
Start: 2023-10-25 | End: 2023-10-26 | Stop reason: HOSPADM

## 2023-10-25 RX ADMIN — ONDANSETRON 4 MG: 2 INJECTION INTRAMUSCULAR; INTRAVENOUS at 12:02

## 2023-10-25 RX ADMIN — GLYCOPYRROLATE 0.2 MG: 0.2 INJECTION, SOLUTION INTRAMUSCULAR; INTRAVENOUS at 11:58

## 2023-10-25 RX ADMIN — LIDOCAINE HYDROCHLORIDE 2 ML: 20 INJECTION, SOLUTION INFILTRATION; PERINEURAL at 11:58

## 2023-10-25 RX ADMIN — ACETAMINOPHEN 650 MG: 325 TABLET ORAL at 13:11

## 2023-10-25 RX ADMIN — SODIUM CHLORIDE, SODIUM LACTATE, POTASSIUM CHLORIDE, CALCIUM CHLORIDE: 600; 310; 30; 20 INJECTION, SOLUTION INTRAVENOUS at 11:07

## 2023-10-25 RX ADMIN — PROPOFOL 200 MCG/KG/MIN: 10 INJECTION, EMULSION INTRAVENOUS at 11:58

## 2023-10-25 RX ADMIN — DEXAMETHASONE SODIUM PHOSPHATE 4 MG: 4 INJECTION, SOLUTION INTRA-ARTICULAR; INTRALESIONAL; INTRAMUSCULAR; INTRAVENOUS; SOFT TISSUE at 12:02

## 2023-10-25 RX ADMIN — FENTANYL CITRATE 50 MCG: 50 INJECTION, SOLUTION INTRAMUSCULAR; INTRAVENOUS at 11:58

## 2023-10-25 NOTE — DISCHARGE INSTRUCTIONS
If you have any questions or concerns regarding your procedure, please contact Dr. Kirkland, his office number is [phone].       Patient Discharge Instructions:    You have just undergone anorectal surgery.  Here are a few things to expect after your surgery:    1) It is common to have pain after surgery.  We try to focus on non-narcotic medications when possible, although sometimes we do give a narcotic prescription for pain that cannot be controlled with Tylenol and Ibuprofen. Take the medications as follows:  Tylenol: 650mg every 4 hours for 48 hours. Then every 4 hour as needed for pain. Do not take the Tylenol if you have been otherwise directed by a doctor not to take it (for liver problems, etc).  Ibuprofen: 800mg every 8 hours for 48 hours WITH FOOD. Then every 8 hours as needed for pain. Don't take NSAIDs if you have Crohn's, Ulcerative Colitis, kidney problems, or have been otherwise instructed not to take them.   You should offset these medications (e.g. Tylenol at 12:00PM, Ibuprofen at 2pm, Tylenol at 4:00PM, Tylenol at 8:00PM, Ibuprofen at 10:00PM).  If you have Oxycodone, you can take it at any time as long as you space it out every 4 hours.     2) You may have some spotting or mild amounts of bleeding/bloody drainage.  If you see more significant bleeding, try holding some pressure over the wound for 15-20 minutes.   If you pass more than a cup full of blood or you cannot get the bleeding to stop by holding pressure, call the office.    3) Infections in this area are rare, but can be serious.  If you see small amounts of yellowish/pus like drainage in the days following surgery, this is expected. However, if you have increasing pain, increasing drainage, fevers, or an inability to urinate (can't pee), call the office or go the ER.     4) You will feel numb in the anorectal area for 4-6 hours after surgery due to the numbing medication used in the operating room.  It is possible you may experience  some fecal incontinence (accidental passage of gas/stool) during this time.  The numbness will resolve on its own.  Once you feel sensation returning, you should consider taking something for pain as you can expect oral medications to take 30-60 minutes before you start feeling their effects.    5) There is generally no specific wound care necessary immediately after surgery (unless otherwise instructed by your surgeon).  Simply showering/bathing 1-2 times a day and after bowel movements is sufficient to keep the wounds clean.  You may want to keep a dry gauze/pad over the wound site as it is normal to have some amount of drainage, and this drainage can be irritating to the skin.  If you have a packing in the wound, your surgeon will give you more specific instructions on how to manage this.    6) For some operations you may have stitches in your wound.  Those stitches almost always break within a few days of surgery.  If you feel a pop or the wound opens - this is OK.  The wound will continue to fill in on its own.  It is still ok to shower/bathe as normal.  Expect some amount of drainage if the wound is open.    7) Avoiding constipation after surgery is very important. Start by taking Psyllium Husk (e.g. Metamucil or Konsyl) 1-2 tablespoons in a large glass of water daily. In addition to this, it is very important to drink at least 64 ounces of water daily. If you get constipated, it will be much more uncomfortable when you do have a bowel movement. If you do not have a BM in 2 days, try one of the following medications (over the counter) listed below:   Milk of Magnesia 30 mL every 8 hours until BM  Miralax 1-2 capfuls daily until BM (this may take 1-2 days)  Senna 1-2 tabs twice a day as necessary    8) Your only activity restrictions are no driving while you are taking narcotics.  You may want to avoid heavy lifting/strenuous exercise for the first 1-2 days after surgery, but if you feel up to resuming normal  activities/exercise, this is ok.    9) Lastly, if you have any questions or concerns - PLEASE CALL [phone])!  Our office has someone on call 24 hours a day.  If your question is one that can wait until normal business hours (Mon-Fri 8:30AM-5PM), it is better to wait until the daytime as someone more familiar with your care will be able to help you.  However, if it is an emergency, the on-call surgeon will be able to give you good advice.    Fabiola Kirkland MD, ANDREIA  [facility]  Office: [phone]  10/25/2023 12:24 PM      For 24 hours after surgery    Get plenty of rest.  A responsible adult must stay with you for at least 24 hours after you leave the hospital.   Do not drive or use heavy equipment.  If you have weakness or tingling, don't drive or use heavy equipment until this feeling goes away.  Do not drink alcohol.  Avoid strenuous or risky activities.  Ask for help when climbing stairs.   You may feel lightheaded.  IF so, sit for a few minutes before standing.  Have someone help you get up.   If you have nausea (feel sick to your stomach): Drink only clear liquids such as apple juice, ginger ale, broth or 7-Up.  Rest may also help.  Be sure to drink enough fluids.  Move to a regular diet as you feel able.  You may have a slight fever. Call the doctor if your fever is over 100 F (37.7 C) (taken under the tongue) or lasts longer than 24 hours.  You may have a dry mouth, a sore throat, muscle aches or trouble sleeping.  These should go away after 24 hours.  Do not make important or legal decisions.   Call your doctor for any of the followin.  Signs of infection (fever, growing tenderness at the surgery site, a large amount of drainage or bleeding, severe pain, foul-smelling drainage, redness, swelling).    2. It has been over 8 to 10 hours since surgery and you are still not able to urinate (pass water).    3.  Headache for over 24 hours.

## 2023-10-25 NOTE — OP NOTE
[facility] OPERATIVE NOTE    DATE OF SERVICE: 10/25/2023     PROCEDURE NAME: Fistulotomy     PRE-PROCEDURE DIAGNOSIS:   Crohn's Disease  Anal fistula     POST-PROCEDURE DIAGNOSIS:   Crohn's Disease  Anal fistula (intersphincteric)     SURGEON: Fabiola Kirkland MD     ASSISTANT: None     FINDINGS: No proctitis. Small intersphincteric fistula.     ESTIMATED BLOOD LOSS: <1 mL     ANESTHESIA: MAC     SPECIMEN: None      INDICATIONS FOR PROCEDURE:  Bi Zavala is a 47 year old male presenting with an anal fistula in the setting of Crohn's disease.  His Crohn's is now very well controlled.  We discussed the risks and benefits and he now presents for fistulotomy.  The risks and benefits of surgery were discussed with the patient including infection, abscess recurrence, need for further operative interventions, possible damage to the sphincter and incontinence (or changes in continence) and increased pain and bleeding were discussed with the patient. Alternatives were also discussed. The patient agreed to proceed with surgery and informed consent was obtained.     DESCRIPTION OF PROCEDURE:  The patient was taken to the operating room and placed under MAC anesthesia. They were then placed in the prone lorrie knife position on the operating room table. The buttocks were taped apart. The surgical area was then prepped and draped in the usual sterile fashion. A timeout was performed per protocol.  Examination of the perianal skin was significant for an external opening in the posterior midline with a seton in place.  Next, a terminal pudendal nerve block was performed with 40mL of a 50/50 mix of 1% lidocaine with epinephrine and 0.25% bupivicaine. A digital rectal exam was performed which revealed a large amount of stool within the rectal vault without any inflammation in the rectum.  Next a lubricated Castro bivalve anoscope was placed into the anal canal.  Examination of the anal canal was significant for normal  mucosa with no proctitis.  There was an internal fistula opening that was quite distal and a seton ran through it.  This was clearly a short intersphincteric fistula.    Based on these findings I proceeded to perform a fistulotomy.  A fistula probe was placed through the tract.  This was divided using electrocautery.  The seton was passed off of the field.  This was such a small fistula that it would not benefit from marsupialization.     Hemostasis was confirmed. All sponge, needle and instrument counts were correct at the end of the procedure. The patient tolerated the procedure well and was awakened from anesthesia without difficulty, and transferred to the recovery room in stable condition.    COMPLICATIONS: None apparent    DISPOSITION: Stable to PACU    Fabiola Kirkland MD, ANDREIA  [facility]  Office: [phone]  10/25/2023 12:24 PM

## 2023-10-25 NOTE — ANESTHESIA PREPROCEDURE EVALUATION
Anesthesia Pre-Procedure Evaluation    Patient: Bi Zavala   MRN: [id number] : 1975        Procedure : Procedure(s):  FISTULA FISTULOTOMY          Past Medical History:   Diagnosis Date    Crohn's disease (H)     Migraine, unspecified, without mention of intractable migraine without mention of status migrainosus     infrequent, good with maxalt 10/05    Noninfectious ileitis     Other specified pervasive developmental disorders, current or active state     aspergers syndrome      Past Surgical History:   Procedure Laterality Date    ABDOMEN SURGERY  at age 7    for hematoma    COLECTOMY  Aug 6,2013    HERNIA REPAIR Bilateral at age 4    inguinal and undescended testicle surgery    LASIK  2010    OTHER SURGICAL HISTORY Left at age 6    fractured elbow    PILONIDAL CYST / SINUS EXCISION      PLACEMENT OF SETON RECTUM N/A 2023    Procedure: EXAM UNDER ANESTHESIA, SETON PLACEMENT;  Surgeon: Fabiola Kirkland MD;  Location: [facility] [place]    [facility] LAP, SURG CLOSE ENTEROSTOMY RESECT ANAST N/A 2014    Procedure: COLOSTOMY TAKEDOWN;  Surgeon: Lashae Morales MD;  Location: [facility];  Service: General    STRABISMUS 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      Allergies   Allergen Reactions    Sulfa Antibiotics Rash      Social History     Tobacco Use    Smoking status: Never    Smokeless tobacco: Not on file   Substance Use Topics    Alcohol use: No      Wt Readings from Last 1 Encounters:   10/24/23 111.1 kg (245 lb)        Anesthesia Evaluation   Pt has had prior anesthetic.     No history of anesthetic complications       ROS/MED HX  ENT/Pulmonary:  - neg pulmonary ROS     Neurologic:  - neg neurologic ROS   (+)      migraines,                          Cardiovascular:  - neg cardiovascular ROS     METS/Exercise Tolerance: >4 METS    Hematologic:  - neg hematologic  ROS     Musculoskeletal:  - neg musculoskeletal ROS     GI/Hepatic:  - neg GI/hepatic ROS   (+)       Inflammatory bowel disease  "(crohn's),             Renal/Genitourinary:  - neg Renal ROS     Endo:  - neg endo ROS     Psychiatric/Substance Use: Comment: Aspergers syndrome - neg psychiatric ROS     Infectious Disease:  - neg infectious disease ROS     Malignancy:  - neg malignancy ROS     Other:  - neg other ROS          Physical Exam    Airway        Mallampati: III   TM distance: > 3 FB   Neck ROM: full   Mouth opening: > 3 cm    Respiratory Devices and Support         Dental       (+) Minor Abnormalities - some fillings, tiny chips      Cardiovascular   cardiovascular exam normal          Pulmonary   pulmonary exam normal                OUTSIDE LABS:  CBC:   Lab Results   Component Value Date    WBC 6.4 11/19/2019    WBC 10.8 01/27/2018    HGB 14.7 11/19/2019    HGB 14.6 01/27/2018    HCT 41.8 11/19/2019    HCT 42.5 01/27/2018     11/19/2019     01/27/2018     BMP:   Lab Results   Component Value Date     11/19/2019     01/27/2018    POTASSIUM 4.1 11/19/2019    POTASSIUM 3.8 01/27/2018    CHLORIDE 108 (H) 11/19/2019    CHLORIDE 109 (H) 01/27/2018    CO2 24 11/19/2019    CO2 21 (L) 01/27/2018    BUN 15 11/19/2019    BUN 14 01/27/2018    CR 0.99 11/19/2019    CR 0.82 01/27/2018     11/19/2019     01/27/2018     COAGS:   Lab Results   Component Value Date    INR 1.14 08/04/2013     POC: No results found for: \"BGM\", \"HCG\", \"HCGS\"  HEPATIC:   Lab Results   Component Value Date    ALBUMIN 4.4 11/19/2019    PROTTOTAL 7.6 11/19/2019    ALT 19 11/19/2019    AST 31 11/19/2019    ALKPHOS 55 11/19/2019    BILITOTAL 0.3 11/19/2019     OTHER:   Lab Results   Component Value Date    RADHA 10.1 11/19/2019    MAG 2.1 06/14/2007    LIPASE 58 (H) 11/19/2019    CRP 0.1 11/19/2019    SED 7 11/19/2019       Anesthesia Plan    ASA Status:  2    NPO Status:  NPO Appropriate    Anesthesia Type: MAC.     - Reason for MAC: immobility needed, straight local not clinically adequate   Induction: Propofol.   Maintenance: TIVA.    "     Consents    Anesthesia Plan(s) and associated risks, benefits, and realistic alternatives discussed. Questions answered and patient/representative(s) expressed understanding.     - Discussed:     - Discussed with:  Patient            Postoperative Care    Pain management: Multi-modal analgesia.   PONV prophylaxis: Ondansetron (or other 5HT-3), Dexamethasone or Solumedrol     Comments:    Other Comments: Versed/fentanyl/propofol  Ketamine PRN  Decadron/zofran      GETA as needed    Reviewed anesthetic options and risks. Patient agrees to proceed.             Florentino Ceballos MD

## 2023-10-25 NOTE — ANESTHESIA CARE TRANSFER NOTE
Patient: Bi Zavala    Procedure: Procedure(s):  FISTULOTOMY       Diagnosis: Crohn disease (H) [K50.90]  Diagnosis Additional Information: No value filed.    Anesthesia Type:   MAC     Note:    Oropharynx: oropharynx clear of all foreign objects and spontaneously breathing  Level of Consciousness: awake  Oxygen Supplementation: room air    Independent Airway: airway patency satisfactory and stable  Dentition: dentition unchanged  Vital Signs Stable: post-procedure vital signs reviewed and stable  Report to RN Given: handoff report given  Patient transferred to: Phase II    Handoff Report: Identifed the Patient, Identified the Reponsible Provider, Reviewed the pertinent medical history, Discussed the surgical course, Reviewed Intra-OP anesthesia mangement and issues during anesthesia, Set expectations for post-procedure period and Allowed opportunity for questions and acknowledgement of understanding      Vitals:  Vitals Value Taken Time   /76 10/25/23 1222   Temp 96.9  F (36.1  C) 10/25/23 1220   Pulse 81 10/25/23 1223   Resp 16    SpO2 94 % 10/25/23 1223   Vitals shown include unfiled device data.    Electronically Signed By: ANGEL Fortune CRNA  October 25, 2023  12:26 PM

## 2023-10-25 NOTE — ANESTHESIA POSTPROCEDURE EVALUATION
Patient: Bi Zavala    Procedure: Procedure(s):  FISTULOTOMY       Anesthesia Type:  MAC    Note:  Disposition: Outpatient   Postop Pain Control: Uneventful            Sign Out: Well controlled pain   PONV: No   Neuro/Psych: Uneventful            Sign Out: Acceptable/Baseline neuro status   Airway/Respiratory: Uneventful            Sign Out: Acceptable/Baseline resp. status   CV/Hemodynamics: Uneventful            Sign Out: Acceptable CV status; No obvious hypovolemia; No obvious fluid overload   Other NRE: NONE   DID A NON-ROUTINE EVENT OCCUR? No           Last vitals:  Vitals Value Taken Time   /67 10/25/23 1245   Temp 96.9  F (36.1  C) 10/25/23 1220   Pulse 78 10/25/23 1247   Resp 16 10/25/23 1230   SpO2 96 % 10/25/23 1247   Vitals shown include unfiled device data.    Electronically Signed By: Florentino Ceballos MD  October 25, 2023  12:50 PM

## 2023-10-25 NOTE — H&P
[facility]   History and Physical       History of Present Illness: 47 year old male w/ hx of Crohn's and anal fistula.     Past Medical History:   Diagnosis Date    Crohn's disease (H)     Migraine, unspecified, without mention of intractable migraine without mention of status migrainosus     infrequent, good with maxalt 10/05    Noninfectious ileitis     Other specified pervasive developmental disorders, current or active state     aspergers syndrome       Allergies   Allergen Reactions    Sulfa Antibiotics Rash       Past Surgical History:   Procedure Laterality Date    ABDOMEN SURGERY  at age 7    for hematoma    COLECTOMY  Aug 6,2013    HERNIA REPAIR Bilateral at age 4    inguinal and undescended testicle surgery    LASIK  2010    OTHER SURGICAL HISTORY Left at age 6    fractured elbow    PILONIDAL CYST / SINUS EXCISION  1996    PLACEMENT OF SETON RECTUM N/A 2/28/2023    Procedure: EXAM UNDER ANESTHESIA, SETON PLACEMENT;  Surgeon: Fabiola Kirkland MD;  Location: [facility] LAP, SURG CLOSE ENTEROSTOMY RESECT ANAST N/A 8/21/2014    Procedure: COLOSTOMY TAKEDOWN;  Surgeon: Lashae Morales MD;  Location: [facility] [place];  Service: General    STRABISMUS 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       Family History   Problem Relation Age of Onset    Musculoskeletal Disorder Mother         MS    Hypertension Mother     Neurologic Disorder Mother         migraine    Arthritis Mother     Allergies Mother     Hypertension Father     Eye Disorder Father     Arthritis Maternal Grandmother     Osteoporosis Maternal Grandmother     Arthritis Maternal Grandfather     Arthritis Paternal Grandmother     Eye Disorder Paternal Grandmother     Heart Disease Paternal Grandfather     Neurologic Disorder Sister         migraines    Diabetes Mother        Social History     Socioeconomic History    Marital status: Single     Spouse name: Not on file    Number of children: Not on file    Years of education:  Not on file    Highest education level: Not on file   Occupational History    Not on file   Tobacco Use    Smoking status: Never    Smokeless tobacco: Not on file   Substance and Sexual Activity    Alcohol use: No    Drug use: No    Sexual activity: Not on file   Other Topics Concern    Not on file   Social History Narrative    Not on file     Social Determinants of Health     Financial Resource Strain: Not on file   Food Insecurity: Not on file   Transportation Needs: Not on file   Physical Activity: Not on file   Stress: Not on file   Social Connections: Not on file   Interpersonal Safety: Not on file   Housing Stability: Not on file       Current Outpatient Medications   Medication    Aspirin-Acetaminophen-Caffeine (EXCEDRIN PO)    azaTHIOprine (IMURAN) 50 MG tablet    cholecalciferol (VITAMIN D3) 25 mcg (1000 units) capsule    Multiple Vitamin (MULTIVITAMIN ADULT PO)    omeprazole (PRILOSEC) 20 MG DR capsule    propranolol ER (INDERAL LA) 120 MG 24 hr capsule    SUMAtriptan (IMITREX) 50 MG tablet    vitamin C (ASCORBIC ACID) 500 MG tablet    zinc gluconate 50 MG tablet     Current Facility-Administered Medications   Medication    lactated ringers infusion    lidocaine (LMX4) kit    lidocaine 1 % 0.1-1 mL    PRE OP antibiotics NOT needed for this surgical procedure    sodium chloride (PF) 0.9% PF flush 3 mL    sodium chloride (PF) 0.9% PF flush 3 mL       Review of Systems:   A full 10 point review of systems was taken and is negative aside from what is noted above in the HPI.    Consitutional / General: Denies wt changes, fevers, chills or sweats.  Skin: Denies rashes, bruising, pruritis, or bleeding tendencies.   ENT: Denies trauma, headache, hearing loss, tinnitus, dysphagia  Eyes: Denies double vision  Respiratory: Denies SOB, cough, sputum production or dyspnea on exertion.   Cardiovascular: Denies chest pain, palpitations, orthostatic hypotension  Hematology / Lymph: Denies hx of blood clots or pulmonary  embolism  Gastrointestinal:  Denies loss of appetite, dysphagia, N/V, constipation or diarrhea.   Genitourinary: Denies dysuria, frequency, urgency, hesitancy, incontinence, nocturia, hematuria, difficulty starting or stopping their stream, hx of stones or hx of uti's.   Neurologic: Denies headache, LOC, memory loss, vertigo, syncope or seizures.   Musculoskeletal: Denies back pain, numbness, tingling or hx of back surgery  Psychological: alert and oriented    Intake/Output past 24 hrs:  No intake or output data in the 24 hours ending 10/25/23 1134    Physical Exam:  Temp:  [97.7  F (36.5  C)] 97.7  F (36.5  C)  Pulse:  [70] 70  Resp:  [16] 16  BP: (116)/(74) 116/74  SpO2:  [96 %] 96 %  General: NAD, alert, cooperative  Head: normocephalic, without abnormality / atraumatic  Respiratory: non-labored breathing, CTA-B  Cardiac: RRR +S1/S2  Abdomen: soft, non-tender, non-distended.  No guarding or rebound   Perianal/Rectal: deferred   Skin: no rashes or lesions  Musculoskeletal: moves all four extremities equally; no calf edema or tenderness  Psychological: alert and oriented, answers questions appropriately  Neurological: cranial nerves grossly intact    CBC RESULTS:   Recent Labs   Lab Test 11/19/19 1944   WBC 6.4   RBC 4.57   HGB 14.7   HCT 41.8   MCV 92   MCH 32.2   MCHC 35.2   RDW 13.7          Last Comprehensive Metabolic Panel:  Sodium   Date Value Ref Range Status   11/19/2019 138 136 - 145 mmol/L Final   08/04/2013 141 133 - 144 mmol/L Final     Potassium   Date Value Ref Range Status   11/19/2019 4.1 3.5 - 5.0 mmol/L Final   08/04/2013 3.7 3.4 - 5.3 mmol/L Final     Chloride   Date Value Ref Range Status   11/19/2019 108 (H) 98 - 107 mmol/L Final   08/04/2013 103 94 - 109 mmol/L Final     Carbon Dioxide   Date Value Ref Range Status   08/04/2013 27 20 - 32 mmol/L Final     Carbon Dioxide (CO2)   Date Value Ref Range Status   11/19/2019 24 22 - 31 mmol/L Final     Anion Gap   Date Value Ref Range Status    11/19/2019 6 5 - 18 mmol/L Final   08/04/2013 11 6 - 17 mmol/L Final     Glucose   Date Value Ref Range Status   11/19/2019 108 70 - 125 mg/dL Final   08/04/2013 130 (H) 60 - 99 mg/dL Final     Urea Nitrogen   Date Value Ref Range Status   11/19/2019 15 8 - 22 mg/dL Final   08/04/2013 10 5 - 24 mg/dL Final     Creatinine   Date Value Ref Range Status   11/19/2019 0.99 0.70 - 1.30 mg/dL Final   08/04/2013 1.03 0.66 - 1.25 mg/dL Final     GFR Estimate   Date Value Ref Range Status   11/19/2019 >60 >60 mL/min/1.73m2 Final   08/04/2013 82 >60 mL/min/1.7m2 Final     Calcium   Date Value Ref Range Status   11/19/2019 10.1 8.5 - 10.5 mg/dL Final   08/04/2013 8.8 8.5 - 10.4 mg/dL Final       Assessment: Bi Zavala is a 47 year old male presenting for rectal exam under anesthesia and fistulotomy.    Plan: Fistulotomy under CATHERINE Kirkland MD, ANDREIA  [facility]  Office: [phone]  10/25/2023 11:34 AM

## 2024-06-26 ENCOUNTER — HOSPITAL ENCOUNTER (EMERGENCY)
Facility: CLINIC | Age: 49
Discharge: HOME | End: 2024-06-26
Attending: EMERGENCY MEDICINE | Admitting: EMERGENCY MEDICINE
Payer: COMMERCIAL

## 2024-06-26 VITALS
TEMPERATURE: 99.9 F | HEART RATE: 76 BPM | HEIGHT: 71 IN | BODY MASS INDEX: 33.6 KG/M2 | SYSTOLIC BLOOD PRESSURE: 138 MMHG | WEIGHT: 240 LBS | DIASTOLIC BLOOD PRESSURE: 88 MMHG | RESPIRATION RATE: 18 BRPM | OXYGEN SATURATION: 97 %

## 2024-06-26 DIAGNOSIS — G43.909 MIGRAINE WITHOUT STATUS MIGRAINOSUS, NOT INTRACTABLE, UNSPECIFIED MIGRAINE TYPE: ICD-10-CM

## 2024-06-26 DIAGNOSIS — R55 NEAR SYNCOPE: ICD-10-CM

## 2024-06-26 PROBLEM — I10 ESSENTIAL HYPERTENSION: Status: ACTIVE | Noted: 2021-09-29

## 2024-06-26 PROBLEM — R73.03 PREDIABETES: Status: ACTIVE | Noted: 2019-03-01

## 2024-06-26 LAB
ACANTHOCYTES BLD QL SMEAR: NORMAL
ALBUMIN SERPL BCG-MCNC: 5 G/DL (ref 3.5–5.2)
ALP SERPL-CCNC: 64 U/L (ref 40–150)
ALT SERPL W P-5'-P-CCNC: 24 U/L (ref 0–70)
ANION GAP SERPL CALCULATED.3IONS-SCNC: 16 MMOL/L (ref 7–15)
AST SERPL W P-5'-P-CCNC: 36 U/L (ref 0–45)
AUER BODIES BLD QL SMEAR: NORMAL
BASO STIPL BLD QL SMEAR: NORMAL
BASOPHILS # BLD AUTO: 0 10E3/UL (ref 0–0.2)
BASOPHILS NFR BLD AUTO: 1 %
BILIRUB SERPL-MCNC: 0.5 MG/DL
BITE CELLS BLD QL SMEAR: NORMAL
BLISTER CELLS BLD QL SMEAR: NORMAL
BUN SERPL-MCNC: 14.2 MG/DL (ref 6–20)
BURR CELLS BLD QL SMEAR: NORMAL
CALCIUM SERPL-MCNC: 10.1 MG/DL (ref 8.6–10)
CHLORIDE SERPL-SCNC: 99 MMOL/L (ref 98–107)
CREAT SERPL-MCNC: 1.2 MG/DL (ref 0.67–1.17)
DACRYOCYTES BLD QL SMEAR: NORMAL
DEPRECATED HCO3 PLAS-SCNC: 21 MMOL/L (ref 22–29)
EGFRCR SERPLBLD CKD-EPI 2021: 76 ML/MIN/1.73M2
ELLIPTOCYTES BLD QL SMEAR: NORMAL
EOSINOPHIL # BLD AUTO: 0.1 10E3/UL (ref 0–0.7)
EOSINOPHIL NFR BLD AUTO: 1 %
ERYTHROCYTE [DISTWIDTH] IN BLOOD BY AUTOMATED COUNT: 13.3 % (ref 10–15)
FRAGMENTS BLD QL SMEAR: NORMAL
GIANT PLATELETS BLD QL SMEAR: NORMAL
GLUCOSE SERPL-MCNC: 108 MG/DL (ref 70–99)
HCT VFR BLD AUTO: 45.2 % (ref 40–53)
HGB BLD-MCNC: 16 G/DL (ref 13.3–17.7)
HGB C CRYSTALS: NORMAL
HOWELL-JOLLY BOD BLD QL SMEAR: NORMAL
IMM GRANULOCYTES # BLD: 0 10E3/UL
IMM GRANULOCYTES NFR BLD: 0 %
LYMPHOCYTES # BLD AUTO: 0.3 10E3/UL (ref 0.8–5.3)
LYMPHOCYTES NFR BLD AUTO: 5 %
MCH RBC QN AUTO: 32.7 PG (ref 26.5–33)
MCHC RBC AUTO-ENTMCNC: 35.4 G/DL (ref 31.5–36.5)
MCV RBC AUTO: 92 FL (ref 78–100)
MONOCYTES # BLD AUTO: 0.5 10E3/UL (ref 0–1.3)
MONOCYTES NFR BLD AUTO: 8 %
NEUTROPHILS # BLD AUTO: 5.2 10E3/UL (ref 1.6–8.3)
NEUTROPHILS NFR BLD AUTO: 85 %
NEUTS HYPERSEG BLD QL SMEAR: NORMAL
NRBC # BLD AUTO: 0 10E3/UL
NRBC BLD AUTO-RTO: 0 /100
PATH REV: NORMAL
PLAT MORPH BLD: NORMAL
PLATELET # BLD AUTO: 126 10E3/UL (ref 150–450)
POLYCHROMASIA BLD QL SMEAR: NORMAL
POTASSIUM SERPL-SCNC: 4.4 MMOL/L (ref 3.4–5.3)
PROT SERPL-MCNC: 8.8 G/DL (ref 6.4–8.3)
RBC # BLD AUTO: 4.89 10E6/UL (ref 4.4–5.9)
RBC AGGLUT BLD QL: NORMAL
RBC MORPH BLD: NORMAL
ROULEAUX BLD QL SMEAR: NORMAL
SICKLE CELLS BLD QL SMEAR: NORMAL
SMUDGE CELLS BLD QL SMEAR: NORMAL
SODIUM SERPL-SCNC: 136 MMOL/L (ref 135–145)
SPHEROCYTES BLD QL SMEAR: NORMAL
STOMATOCYTES BLD QL SMEAR: NORMAL
TARGETS BLD QL SMEAR: NORMAL
TOXIC GRANULES BLD QL SMEAR: NORMAL
TROPONIN T SERPL HS-MCNC: <6 NG/L
TROPONIN T SERPL HS-MCNC: <6 NG/L
VARIANT LYMPHS BLD QL SMEAR: NORMAL
WBC # BLD AUTO: 6.1 10E3/UL (ref 4–11)

## 2024-06-26 PROCEDURE — 258N000003 HC RX IP 258 OP 636: Performed by: EMERGENCY MEDICINE

## 2024-06-26 PROCEDURE — 85004 AUTOMATED DIFF WBC COUNT: CPT | Performed by: EMERGENCY MEDICINE

## 2024-06-26 PROCEDURE — 250N000011 HC RX IP 250 OP 636: Mod: JZ | Performed by: EMERGENCY MEDICINE

## 2024-06-26 PROCEDURE — 36415 COLL VENOUS BLD VENIPUNCTURE: CPT | Performed by: EMERGENCY MEDICINE

## 2024-06-26 PROCEDURE — 96366 THER/PROPH/DIAG IV INF ADDON: CPT

## 2024-06-26 PROCEDURE — 96375 TX/PRO/DX INJ NEW DRUG ADDON: CPT

## 2024-06-26 PROCEDURE — 93005 ELECTROCARDIOGRAM TRACING: CPT

## 2024-06-26 PROCEDURE — 99284 EMERGENCY DEPT VISIT MOD MDM: CPT | Mod: 25

## 2024-06-26 PROCEDURE — 82374 ASSAY BLOOD CARBON DIOXIDE: CPT | Performed by: EMERGENCY MEDICINE

## 2024-06-26 PROCEDURE — 82247 BILIRUBIN TOTAL: CPT | Performed by: EMERGENCY MEDICINE

## 2024-06-26 PROCEDURE — 96365 THER/PROPH/DIAG IV INF INIT: CPT

## 2024-06-26 PROCEDURE — 93010 ELECTROCARDIOGRAM REPORT: CPT | Performed by: EMERGENCY MEDICINE

## 2024-06-26 PROCEDURE — 84484 ASSAY OF TROPONIN QUANT: CPT | Performed by: EMERGENCY MEDICINE

## 2024-06-26 PROCEDURE — 99284 EMERGENCY DEPT VISIT MOD MDM: CPT | Performed by: EMERGENCY MEDICINE

## 2024-06-26 RX ORDER — DIPHENHYDRAMINE HYDROCHLORIDE 50 MG/ML
25 INJECTION INTRAMUSCULAR; INTRAVENOUS ONCE
Status: COMPLETED | OUTPATIENT
Start: 2024-06-26 | End: 2024-06-26

## 2024-06-26 RX ORDER — METOCLOPRAMIDE HYDROCHLORIDE 5 MG/ML
10 INJECTION INTRAMUSCULAR; INTRAVENOUS ONCE
Status: COMPLETED | OUTPATIENT
Start: 2024-06-26 | End: 2024-06-26

## 2024-06-26 RX ADMIN — SODIUM CHLORIDE 500 ML: 9 INJECTION, SOLUTION INTRAVENOUS at 16:40

## 2024-06-26 RX ADMIN — METOCLOPRAMIDE HYDROCHLORIDE 10 MG: 5 INJECTION INTRAMUSCULAR; INTRAVENOUS at 16:35

## 2024-06-26 RX ADMIN — SODIUM CHLORIDE, POTASSIUM CHLORIDE, SODIUM LACTATE AND CALCIUM CHLORIDE 1000 ML: 600; 310; 30; 20 INJECTION, SOLUTION INTRAVENOUS at 14:12

## 2024-06-26 RX ADMIN — DIPHENHYDRAMINE HYDROCHLORIDE 25 MG: 50 INJECTION, SOLUTION INTRAMUSCULAR; INTRAVENOUS at 16:35

## 2024-06-26 ASSESSMENT — ENCOUNTER SYMPTOMS
SEIZURES: 0
SHORTNESS OF BREATH: 0
PALPITATIONS: 0
EYES NEGATIVE: 1
HEMATOLOGIC/LYMPHATIC NEGATIVE: 1
RESPIRATORY NEGATIVE: 1
NEUROLOGICAL NEGATIVE: 1
SPEECH DIFFICULTY: 0
ALLERGIC/IMMUNOLOGIC NEGATIVE: 1
DIZZINESS: 0
CARDIOVASCULAR NEGATIVE: 1
WEAKNESS: 0
GASTROINTESTINAL NEGATIVE: 1
DIAPHORESIS: 1
LIGHT-HEADEDNESS: 0
PSYCHIATRIC NEGATIVE: 1
NUMBNESS: 0
MUSCULOSKELETAL NEGATIVE: 1
SORE THROAT: 0
SINUS PAIN: 0
FACIAL ASYMMETRY: 0
HEADACHES: 0
CHEST TIGHTNESS: 0
TREMORS: 0
RHINORRHEA: 0
SINUS PRESSURE: 0

## 2024-06-26 ASSESSMENT — COLUMBIA-SUICIDE SEVERITY RATING SCALE - C-SSRS
2. HAVE YOU ACTUALLY HAD ANY THOUGHTS OF KILLING YOURSELF IN THE PAST MONTH?: NO
6. HAVE YOU EVER DONE ANYTHING, STARTED TO DO ANYTHING, OR PREPARED TO DO ANYTHING TO END YOUR LIFE?: NO
1. IN THE PAST MONTH, HAVE YOU WISHED YOU WERE DEAD OR WISHED YOU COULD GO TO SLEEP AND NOT WAKE UP?: NO

## 2024-06-26 ASSESSMENT — ACTIVITIES OF DAILY LIVING (ADL)
ADLS_ACUITY_SCORE: 33
ADLS_ACUITY_SCORE: 35

## 2024-06-26 NOTE — ED NOTES
Pt was working in hot environment  with plastic products.  Pt started sweating profusely and became near syncopal.   Pt stopped working and sat down and drank a bottle gatorade and water.  Pt reports feeling a little bit better after drinking fluids.

## 2024-06-26 NOTE — ED PROVIDER NOTES
Acute  History     Chief Complaint   Patient presents with    Syncope     HPI  History per patient, review of [facility] EMR and Care Everywhere EMR, including review of most recent clinic encounter, laboratory workup including CBC and TSH and and prior MRI brain and echocardiogram imaging evaluations as documented below.    Bi Zavala is a 48 year old male with history of Crohn's disease, migraine headaches, Asperger's syndrome and esotropia who presents today following a near syncopal episode. 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 At around 10am today while he was on break at work he became light headed, he felt like the room was spinning around him, started sweating profusely, and had numbness/tingling in his hands. He denies any chest pain or SOA with the incident, no palpitations. He did not lose consciousness. He feels normal now. He reports he felt back to baseline by noon today. He reports he was drinking his normal amount of water and gatorade and tries to stay hydrated throughout the work day. A similar episode happened last summer when he was mowing his lawn, but he actually lost consciousness during this episode. He did not seek medical attention afterwards. He had an Echo done in 2003 for chest pain which was negative. He does take 160mg of Propanolol daily for migraine prophylaxis and BP control, but he has been on this for a couple of years.  Prev syncope episode ~ 12  yr ago with no eval, while riding a  and mowing in the heat.  No other pertinent history or acute complaints or concerns.    Previous Records Reviewed:  MR BRAIN WO IV CONT  - 1/29/2019     INDICATION: Chronic headaches with nighttime awakenings     FINDINGS:   INTRACRANIAL CONTENTS: No acute or subacute infarct. No mass, acute hemorrhage, or extra-axial fluid collections. Scattered nonspecific foci of T2/FLAIR hyperintense signal in the cerebral  white matter are of doubtful clinical significance. Ventricles and sulci are normal for age. Normal position of the cerebellar tonsils.     SELLA: No significant abnormality accounting for technique.     OSSEOUS STRUCTURES/SOFT TISSUES: No aggressive osseous lesion involving the calvarium, skull base, or visualized upper cervical spine. The major intracranial vascular flow voids are maintained.     ORBITS: No significant abnormality accounting for technique.     SINUSES/MASTOIDS: Minimal mucosal thickening left maxillary sinus. No significant middle ear or mastoid effusion.     CONCLUSION:   1. No acute infarct, mass or hemorrhage.       3/31/03 Echocardiogram for chest pain:  Normal      Allergies:  Allergies   Allergen Reactions    Sulfa Antibiotics Rash       Problem List:    Patient Active Problem List    Diagnosis Date Noted    Essential hypertension 09/29/2021     Priority: Medium    Prediabetes 03/01/2019     Priority: Medium    Pancreatitis 09/04/2014     Priority: Medium    Small bowel obstruction (H) 09/03/2014     Priority: Medium    S/P colostomy (H) 08/21/2014     Priority: Medium    Migraine headache 08/19/2014     Priority: Medium     Formatting of this note might be different from the original.   improved control so far with nortriptyline.  abortive therapy successful most with sumitriptan injection vs pills.  f/u neuro after surgery      Regional enteritis (H) 07/11/2014     Priority: Medium     Formatting of this note might be different from the original.   unspecific diagnosis but two major episodes  2007, 2013[facility]  Diagnosed as crohns 8/2013   ; Crohn disease- ileo colonic      Iron deficiency anemia 12/03/2012     Priority: Medium     Formatting of this note might be different from the original.   microhematuria since this was found - CT stone check - consider urology workup pending UA, ct and r/o gi bleeding      Mild cognitive impairment 09/08/2011      Priority: Medium     Formatting of this note might be different from the original.   Unclear etiology - but very functional - works basic job, close connection with family   Body habitus and smaller testicles - klinefelters?      CARDIOVASCULAR SCREENING; LDL GOAL LESS THAN 160 10/31/2010     Priority: Medium    Esotropia 09/30/2010     Priority: Medium     Formatting of this note might be different from the original.   Epic      REG ENTERITIS, LG INTEST 08/07/2008     Priority: Medium     probably not inflammatory bowel disease apparently per GI - get records - currently asx, and not on asocol or prednisone.      Anemia 06/25/2007     Priority: Medium     appears to have stabilized but needs to reverse to keep above 8 - relatively asx - start nifrex   Problem list name updated by automated process. Provider to review      Hypopotassemia 06/25/2007     Priority: Medium     probably from prednisone, though had started prior w diarrhea.  stable on 40 meq KCl      Other type of migraine 10/16/2005     Priority: Medium     Diagnosis updated by automated process. Provider to review and confirm.          Past Medical History:    Past Medical History:   Diagnosis Date    Crohn's disease (H)     Essential hypertension 9/29/2021    Migraine, unspecified, without mention of intractable migraine without mention of status migrainosus     Noninfectious ileitis     Other specified pervasive developmental disorders, current or active state     Pancreatitis 9/4/2014       Past Surgical History:    Past Surgical History:   Procedure Laterality Date    ABDOMEN SURGERY  at age 7    for hematoma    COLECTOMY  Aug 6,2013    HERNIA REPAIR Bilateral at age 4    inguinal and undescended testicle surgery    LASIK  2010    OTHER SURGICAL HISTORY Left at age 6    fractured elbow    PILONIDAL CYST / SINUS EXCISION  1996    PLACEMENT OF SETON RECTUM N/A 2/28/2023    Procedure: EXAM UNDER ANESTHESIA, SETON PLACEMENT;  Surgeon: Fabiola Kirkland MD;   Location: [facility] OR    AL LAP, SURG CLOSE ENTEROSTOMY RESECT ANAST N/A 8/21/2014    Procedure: COLOSTOMY TAKEDOWN;  Surgeon: Lashae Morales MD;  Location: [facility] OR;  Service: General    REMOVE FISTULA ANAL N/A 10/25/2023    Procedure: FISTULOTOMY;  Surgeon: Fabiola Kirkland MD;  Location: [facility]    STRABISMUS 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       Family History:    Family History   Problem Relation Age of Onset    Musculoskeletal Disorder Mother         MS    Hypertension Mother     Neurologic Disorder Mother         migraine    Arthritis Mother     Allergies Mother     Hypertension Father     Eye Disorder Father     Arthritis Maternal Grandmother     Osteoporosis Maternal Grandmother     Arthritis Maternal Grandfather     Arthritis Paternal Grandmother     Eye Disorder Paternal Grandmother     Heart Disease Paternal Grandfather     Neurologic Disorder Sister         migraines    Diabetes Mother        Social History:  Marital Status:  Single [1]  Social History     Tobacco Use    Smoking status: Never   Substance Use Topics    Alcohol use: No    Drug use: No        Medications:    Aspirin-Acetaminophen-Caffeine (EXCEDRIN PO)  azaTHIOprine (IMURAN) 50 MG tablet  cholecalciferol (VITAMIN D3) 25 mcg (1000 units) capsule  Multiple Vitamin (MULTIVITAMIN ADULT PO)  omeprazole (PRILOSEC) 20 MG DR capsule  propranolol ER (INDERAL LA) 120 MG 24 hr capsule  SUMAtriptan (IMITREX) 50 MG tablet  vitamin C (ASCORBIC ACID) 500 MG tablet  zinc gluconate 50 MG tablet          Review of Systems   Constitutional:  Positive for diaphoresis.        With pre-syncopal episode    HENT:  Negative for congestion, ear pain, postnasal drip, rhinorrhea, sinus pressure, sinus pain and sore throat.    Eyes: Negative.    Respiratory: Negative.  Negative for chest tightness and shortness of breath.    Cardiovascular: Negative.  Negative for chest pain, palpitations and leg swelling.   Gastrointestinal: Negative.   "  Endocrine: Positive for heat intolerance.        Reports he \"always runs hot.\"   Genitourinary: Negative.    Musculoskeletal: Negative.    Skin: Negative.    Allergic/Immunologic: Negative.    Neurological: Negative.  Negative for dizziness, tremors, seizures, syncope, facial asymmetry, speech difficulty, weakness, light-headedness, numbness and headaches.   Hematological: Negative.    Psychiatric/Behavioral: Negative.         Physical Exam   BP: 128/83  Pulse: (!) 18  Temp: 99.9  F (37.7  C)  Resp: 18  Height: 180.3 cm (5' 11\")  Weight: 108.9 kg (240 lb)  SpO2: 100 %  Lying Orthostatic BP: 131/80  Lying Orthostatic Pulse: 80 bpm  Sitting Orthostatic BP: 119/95  Sitting Orthostatic Pulse: 82 bpm  Standing Orthostatic BP: 124/83  Standing Orthostatic Pulse: 88 bpm      Physical Exam  Vitals and nursing note reviewed.   Constitutional:       General: He is not in acute distress.     Appearance: Normal appearance. He is well-developed. He is not ill-appearing or diaphoretic.   HENT:      Head: Normocephalic and atraumatic.      Right Ear: Tympanic membrane and external ear normal.      Left Ear: Tympanic membrane and external ear normal.      Nose: Nose normal.      Mouth/Throat:      Mouth: Mucous membranes are moist.   Eyes:      General: No scleral icterus.     Extraocular Movements: Extraocular movements intact.      Conjunctiva/sclera: Conjunctivae normal.      Pupils: Pupils are equal, round, and reactive to light.      Comments: Strabismus, he reports this is chronic and stable.   Neck:      Trachea: No tracheal deviation.   Cardiovascular:      Rate and Rhythm: Normal rate and regular rhythm.      Pulses: Normal pulses.      Heart sounds: Normal heart sounds. No murmur heard.     No friction rub. No gallop.   Pulmonary:      Effort: Pulmonary effort is normal. No respiratory distress.      Breath sounds: Normal breath sounds. No wheezing, rhonchi or rales.   Abdominal:      General: There is no distension.    "   Palpations: Abdomen is soft.      Tenderness: There is no abdominal tenderness.   Musculoskeletal:         General: No swelling or tenderness. Normal range of motion.      Cervical back: Normal range of motion and neck supple.      Right lower leg: No edema.      Left lower leg: No edema.   Skin:     General: Skin is warm and dry.      Coloration: Skin is not pale.      Findings: No erythema or rash.   Neurological:      General: No focal deficit present.      Mental Status: He is alert and oriented to person, place, and time. Mental status is at baseline.      Cranial Nerves: No cranial nerve deficit.      Sensory: No sensory deficit.      Motor: No weakness.      Coordination: Coordination normal.      Gait: Gait normal.   Psychiatric:         Mood and Affect: Mood normal.         Behavior: Behavior normal.         ED Course        Procedures              EKG Interpretation:      Interpreted by Bi Thompson MD  Time reviewed: upon completion  Symptoms at time of EKG: s/p near syncopal episode   Rhythm: normal sinus   Rate: normal  Axis: normal  Ectopy: none  Conduction: normal  ST Segments/ T Waves: No ST-T wave changes  Q Waves: none  Comparison to prior: 8/4/13  Clinical Impression: normal EKG       Results for orders placed or performed during the hospital encounter of 06/26/24   Comprehensive metabolic panel     Status: Abnormal   Result Value Ref Range    Sodium 136 135 - 145 mmol/L    Potassium 4.4 3.4 - 5.3 mmol/L    Carbon Dioxide (CO2) 21 (L) 22 - 29 mmol/L    Anion Gap 16 (H) 7 - 15 mmol/L    Urea Nitrogen 14.2 6.0 - 20.0 mg/dL    Creatinine 1.20 (H) 0.67 - 1.17 mg/dL    GFR Estimate 76 >60 mL/min/1.73m2    Calcium 10.1 (H) 8.6 - 10.0 mg/dL    Chloride 99 98 - 107 mmol/L    Glucose 108 (H) 70 - 99 mg/dL    Alkaline Phosphatase 64 40 - 150 U/L    AST 36 0 - 45 U/L    ALT 24 0 - 70 U/L    Protein Total 8.8 (H) 6.4 - 8.3 g/dL    Albumin 5.0 3.5 - 5.2 g/dL    Bilirubin Total 0.5 <=1.2 mg/dL   CBC with  platelets and differential     Status: Abnormal   Result Value Ref Range    WBC Count 6.1 4.0 - 11.0 10e3/uL    RBC Count 4.89 4.40 - 5.90 10e6/uL    Hemoglobin 16.0 13.3 - 17.7 g/dL    Hematocrit 45.2 40.0 - 53.0 %    MCV 92 78 - 100 fL    MCH 32.7 26.5 - 33.0 pg    MCHC 35.4 31.5 - 36.5 g/dL    RDW 13.3 10.0 - 15.0 %    Platelet Count 126 (L) 150 - 450 10e3/uL    % Neutrophils 85 %    % Lymphocytes 5 %    % Monocytes 8 %    % Eosinophils 1 %    % Basophils 1 %    % Immature Granulocytes 0 %    NRBCs per 100 WBC 0 <1 /100    Absolute Neutrophils 5.2 1.6 - 8.3 10e3/uL    Absolute Lymphocytes 0.3 (L) 0.8 - 5.3 10e3/uL    Absolute Monocytes 0.5 0.0 - 1.3 10e3/uL    Absolute Eosinophils 0.1 0.0 - 0.7 10e3/uL    Absolute Basophils 0.0 0.0 - 0.2 10e3/uL    Absolute Immature Granulocytes 0.0 <=0.4 10e3/uL    Absolute NRBCs 0.0 10e3/uL   RBC and Platelet Morphology     Status: None   Result Value Ref Range    RBC Morphology Confirmed RBC Indices     Platelet Assessment  Automated Count Confirmed. Platelet morphology is normal.     Automated Count Confirmed. Platelet morphology is normal.    Giant Platelets      Acanthocytes      Taylor Rods      Basophilic Stippling      Bite Cells      Blister Cells      Samina Cells      Elliptocytes      Hgb C Crystals      Denton-Jolly Bodies      Hypersegmented Neutrophils      Polychromasia      RBC agglutination      RBC Fragments      Reactive Lymphocytes      Rouleaux      Sickle Cells      Smudge Cells      Spherocytes      Stomatocytes      Target Cells      Teardrop Cells      Toxic Neutrophils      Pathologist Review Comments (Blood)     Troponin T, High Sensitivity     Status: Normal   Result Value Ref Range    Troponin T, High Sensitivity <6 <=22 ng/L   Troponin T, High Sensitivity     Status: Normal   Result Value Ref Range    Troponin T, High Sensitivity <6 <=22 ng/L   CBC with platelets differential     Status: Abnormal    Narrative    The following orders were created for  panel order CBC with platelets differential.  Procedure                               Abnormality         Status                     ---------                               -----------         ------                     CBC with platelets and d...[[id number]]  Abnormal            Final result               RBC and Platelet Morphology[[id number]]                      Final result                 Please view results for these tests on the individual orders.           Medications   lactated ringers BOLUS 1,000 mL (0 mLs Intravenous Stopped 6/26/24 1731)   metoclopramide (REGLAN) injection 10 mg (10 mg Intravenous $Given 6/26/24 1635)   diphenhydrAMINE (BENADRYL) injection 25 mg (25 mg Intravenous $Given 6/26/24 1635)   sodium chloride 0.9% BOLUS 500 mL (0 mLs Intravenous Stopped 6/26/24 1731)     4:12 PM - Shortly prior to discharge he c/o developing a typical migraine with frontal headache, nausea and photphobia.    5:27 PM - Patient reports migraine has resolved and is resting comfortably.     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 He became light headed, he felt like the room was spinning around him, started sweating profusely, and had numbness/tingling in his hands.  No chest pain, palpitations or other signs or symptoms to suggest ACS, dysrhythmia, PE/DVT, TIA/CVA or seizure.  Today's evaluation is unremarkable and appears stable for discharge home with what I believe is a vasovagal near syncopal episode.  We discussed the differential diagnoses and he understands that the etiology of this episode is unclear and that he needs follow-up in clinic ASAP for reevaluation and consideration for further workup and evaluation.   Prior to discharge she developed a typical migraine headache which was effectively treated with Reglan and Benadryl. History, signs  and symptoms, and exam are consistent with migraine headache. Doubt SAH/ICH, aneurysm/dissection, CVA, venous sinus thrombosis, meningitis or encephalitis, or pseudotumor cerebri/idiopathic intracranial hypertension. Significantly improved after migraine meds and comfortable with d/c home.   He will follow-up with his PMD at the [facility].  We discussed the differential diagnoses and he understands that the etiology of this episode is unclear but most likely vasovagal in nature.  He is comfortable today's evaluation disposition plan and we discussed signs and symptoms that indicate need for emergent reevaluation.  He was provided instructions for supportive care and will return as needed for worsened condition or worsening symptoms, or new problems or concerns.     I have reviewed the nursing notes.    I have reviewed the findings, diagnosis, plan and need for follow up with the patient.    Medical Decision Making: High Complexity  Hospitalization for observation,  cardiac monitoring and serial troponins was considered and deferred. Currently appears stable and appropriate for outpatient management with close f/u.      Discharge Medication List as of 6/26/2024  5:31 PM          Final diagnoses:   Near syncope   Migraine without status migrainosus, not intractable, unspecified migraine type       6/26/2024   [facility] EMERGENCY DEPT  8/20/2023    Medical/DAKSHA Student  Attestation   I, Bi Thompson MD, was present with the medical/DAKSHA student who participated in the service and in the documentation of the note. I have verified and independently obtained the history and personally performed the HPI, ROS, Physical Exam and Medical Decision Making. I have amended and completed this note, including amendment of the HPI, ROS, Physical Exam and Medical Decision Making. I agree with and have completed the assessment and plan of care as documented in the note.     ED Attending Physician  Attestation   I, Bi Thompson MD evaluated and cared for the patient as part of a shared visit with the student Advanced Practice Provider (DAKSHA). I personally provided a substantive portion of the care for this patient, including approving the care plan for the number and complexity of problems addressed and taking responsibility related to the risk of complications and/or morbidity or mortality of patient management. Please see the DAKSHA's documentation for full details.  I have reviewed and discussed with the advanced practice provider their history, physical and plan.  We reviewed the patient's evaluation, medical decision making and treatment plan. I personally reviewed the vital signs, medications, labs, and EKG.  I spent 25 minutes face-to-face and/or coordinating care. Over 50% of our time on the unit was spent counseling the patient and/or coordinating care regarding the differential diagnoses, today's evaluation, the disposition and follow-up plan.       Summary of HPI, PE, ED Course  My key history or physical exam findings:   Patient is a 48 year old male evaluated in the emergency department for a near syncopal episode at work shortly prior to arrival.  He felt lightheaded, dizzy and room spinning with sweating and distal extremity paresthesias. Exam notable for normal vital signs, normal orthostatics, chronic esotropia/strabismus, normal cardiovascular and pulmonary exam, neurologically intact.  He appears at his baseline.  ED course notable for: EKG and laboratory evaluation were performed, IV fluid bolus was given.  Prior to discharge he complained of a typical migraine headache which was effectively treated with Reglan and Benadryl.     Critical Care & Procedures  Not applicable.    Medical Decision Making  Key management decisions made by me: I ordered his EKG and laboratory evaluation and considered and elected to defer imaging evaluation today, CT or MRI head/brain, chest x-ray.  Medical  Decision Making The medical record was reviewed and interpreted.  Current labs reviewed and interpreted.  Previous labs reviewed and interpreted.  Previous images reviewed and interpreted: As documented above.  EKG reviewed and interpreted: Normal EKG.    Disposition  After the completion of care in the emergency department, the patient was discharged.    I agree with the DAKSHA students evaluation, assessment and treatment plan.      Bi Thompson MD  Date of Service (when I saw the patient): 6/26/24           Bi Thompson MD  06/28/24 4635

## 2024-06-26 NOTE — ED TRIAGE NOTES
Triage Assessment (Adult)       Row Name 06/26/24 1100          Triage Assessment    Airway WDL WDL        Respiratory WDL    Respiratory WDL WDL

## 2024-11-03 ENCOUNTER — HEALTH MAINTENANCE LETTER (OUTPATIENT)
Age: 49
End: 2024-11-03

## (undated) DEVICE — VESSEL LOOP BLUE MAXI 30-723

## (undated) DEVICE — LUBRICANT SURG 2 OZ STRL FLIP TOP 2OZLUB

## (undated) DEVICE — NEEDLE HYPO 22X1-1/2 SAFETY 305900

## (undated) DEVICE — CUSTOM PACK GEN MAJOR SBA5BGMHEA

## (undated) DEVICE — ESU PENCIL SMOKE EVAC W/ROCKER SWITCH 0703-047-000

## (undated) DEVICE — DRSG KERLIX FLUFFS X5

## (undated) DEVICE — BLADE KNIFE SURG 15 371115

## (undated) DEVICE — BRIEF STRETCH XL MPS40

## (undated) DEVICE — PREP POVIDONE-IODINE 10% SOLUTION 4OZ BOTTLE MDS093944

## (undated) DEVICE — SOL WATER IRRIG 1000ML BOTTLE 2F7114

## (undated) DEVICE — GLOVE BIOGEL INDICATOR 7.5 LF 41675

## (undated) DEVICE — SU PROLENE 0 CT-1 30" 8424H

## (undated) DEVICE — DECANTER VIAL 2006S

## (undated) DEVICE — SU CHROMIC 3-0 SH 27" G122H

## (undated) DEVICE — SUCTION TIP YANKAUER W/O VENT K86

## (undated) DEVICE — SUTURE SILK 3-0 TIES 18IN A184H

## (undated) DEVICE — SPONGE RAY-TEC 4X8" 7318

## (undated) DEVICE — GLOVE UNDER INDICATOR PI SZ 7.0 LF 41670

## (undated) DEVICE — DRSG ABD TNDRSRB WET PRUF 8IN X 10IN STRL  9194A

## (undated) DEVICE — PLATE GROUNDING ADULT W/CORD 9165L

## (undated) DEVICE — SUCTION MANIFOLD NEPTUNE 2 SYS 1 PORT 702-025-000

## (undated) DEVICE — SYR 10ML LL W/O NDL 302995

## (undated) DEVICE — DRAPE OR LAPAROTOMY KC 89228*

## (undated) DEVICE — TRAY PREP DRY SKIN SCRUB 067

## (undated) RX ORDER — FENTANYL CITRATE 50 UG/ML
INJECTION, SOLUTION INTRAMUSCULAR; INTRAVENOUS
Status: DISPENSED
Start: 2023-02-28

## (undated) RX ORDER — BUPIVACAINE HYDROCHLORIDE 2.5 MG/ML
INJECTION, SOLUTION EPIDURAL; INFILTRATION; INTRACAUDAL
Status: DISPENSED
Start: 2023-02-28

## (undated) RX ORDER — KETAMINE HYDROCHLORIDE 10 MG/ML
INJECTION INTRAMUSCULAR; INTRAVENOUS
Status: DISPENSED
Start: 2023-02-28